# Patient Record
Sex: MALE | Race: WHITE | URBAN - METROPOLITAN AREA
[De-identification: names, ages, dates, MRNs, and addresses within clinical notes are randomized per-mention and may not be internally consistent; named-entity substitution may affect disease eponyms.]

---

## 2023-03-07 ENCOUNTER — OFFICE VISIT (OUTPATIENT)
Dept: PRIMARY CARE CLINIC | Age: 28
End: 2023-03-07
Payer: COMMERCIAL

## 2023-03-07 VITALS
HEART RATE: 103 BPM | BODY MASS INDEX: 45.1 KG/M2 | DIASTOLIC BLOOD PRESSURE: 75 MMHG | TEMPERATURE: 97.8 F | OXYGEN SATURATION: 95 % | HEIGHT: 70 IN | WEIGHT: 315 LBS | SYSTOLIC BLOOD PRESSURE: 123 MMHG

## 2023-03-07 DIAGNOSIS — Z86.59 HISTORY OF PANIC ATTACKS: ICD-10-CM

## 2023-03-07 DIAGNOSIS — E66.01 CLASS 3 SEVERE OBESITY DUE TO EXCESS CALORIES WITHOUT SERIOUS COMORBIDITY WITH BODY MASS INDEX (BMI) OF 45.0 TO 49.9 IN ADULT (HCC): ICD-10-CM

## 2023-03-07 DIAGNOSIS — E78.2 MIXED HYPERLIPIDEMIA: ICD-10-CM

## 2023-03-07 DIAGNOSIS — F41.8 MIXED ANXIETY AND DEPRESSIVE DISORDER: Primary | ICD-10-CM

## 2023-03-07 PROBLEM — F34.1 DYSTHYMIA: Status: ACTIVE | Noted: 2019-11-14

## 2023-03-07 PROBLEM — E66.813 CLASS 3 SEVERE OBESITY DUE TO EXCESS CALORIES WITHOUT SERIOUS COMORBIDITY WITH BODY MASS INDEX (BMI) OF 45.0 TO 49.9 IN ADULT: Status: ACTIVE | Noted: 2023-03-07

## 2023-03-07 PROCEDURE — 99203 OFFICE O/P NEW LOW 30 MIN: CPT | Performed by: FAMILY MEDICINE

## 2023-03-07 RX ORDER — CITALOPRAM 20 MG/1
20 TABLET ORAL DAILY
COMMUNITY
Start: 2022-10-17 | End: 2023-03-07 | Stop reason: SDUPTHER

## 2023-03-07 RX ORDER — BUSPIRONE HYDROCHLORIDE 30 MG/1
30 TABLET ORAL DAILY
Qty: 90 TABLET | Refills: 1 | Status: SHIPPED | OUTPATIENT
Start: 2023-03-07 | End: 2023-06-05

## 2023-03-07 RX ORDER — BUSPIRONE HYDROCHLORIDE 30 MG/1
30 TABLET ORAL DAILY
COMMUNITY
Start: 2022-10-17 | End: 2023-03-07 | Stop reason: SDUPTHER

## 2023-03-07 RX ORDER — CITALOPRAM 20 MG/1
20 TABLET ORAL DAILY
Qty: 30 TABLET | Refills: 1 | Status: SHIPPED | OUTPATIENT
Start: 2023-03-07 | End: 2023-06-05

## 2023-03-07 SDOH — ECONOMIC STABILITY: HOUSING INSECURITY
IN THE LAST 12 MONTHS, WAS THERE A TIME WHEN YOU DID NOT HAVE A STEADY PLACE TO SLEEP OR SLEPT IN A SHELTER (INCLUDING NOW)?: NO

## 2023-03-07 SDOH — ECONOMIC STABILITY: FOOD INSECURITY: WITHIN THE PAST 12 MONTHS, THE FOOD YOU BOUGHT JUST DIDN'T LAST AND YOU DIDN'T HAVE MONEY TO GET MORE.: NEVER TRUE

## 2023-03-07 SDOH — ECONOMIC STABILITY: FOOD INSECURITY: WITHIN THE PAST 12 MONTHS, YOU WORRIED THAT YOUR FOOD WOULD RUN OUT BEFORE YOU GOT MONEY TO BUY MORE.: NEVER TRUE

## 2023-03-07 SDOH — ECONOMIC STABILITY: INCOME INSECURITY: HOW HARD IS IT FOR YOU TO PAY FOR THE VERY BASICS LIKE FOOD, HOUSING, MEDICAL CARE, AND HEATING?: NOT HARD AT ALL

## 2023-03-07 ASSESSMENT — PATIENT HEALTH QUESTIONNAIRE - PHQ9
5. POOR APPETITE OR OVEREATING: 3
SUM OF ALL RESPONSES TO PHQ QUESTIONS 1-9: 12
9. THOUGHTS THAT YOU WOULD BE BETTER OFF DEAD, OR OF HURTING YOURSELF: 0
8. MOVING OR SPEAKING SO SLOWLY THAT OTHER PEOPLE COULD HAVE NOTICED. OR THE OPPOSITE, BEING SO FIGETY OR RESTLESS THAT YOU HAVE BEEN MOVING AROUND A LOT MORE THAN USUAL: 1
1. LITTLE INTEREST OR PLEASURE IN DOING THINGS: 3
4. FEELING TIRED OR HAVING LITTLE ENERGY: 1
6. FEELING BAD ABOUT YOURSELF - OR THAT YOU ARE A FAILURE OR HAVE LET YOURSELF OR YOUR FAMILY DOWN: 1
SUM OF ALL RESPONSES TO PHQ QUESTIONS 1-9: 12
7. TROUBLE CONCENTRATING ON THINGS, SUCH AS READING THE NEWSPAPER OR WATCHING TELEVISION: 0
2. FEELING DOWN, DEPRESSED OR HOPELESS: 3
SUM OF ALL RESPONSES TO PHQ9 QUESTIONS 1 & 2: 6
3. TROUBLE FALLING OR STAYING ASLEEP: 0
SUM OF ALL RESPONSES TO PHQ QUESTIONS 1-9: 12
SUM OF ALL RESPONSES TO PHQ QUESTIONS 1-9: 12

## 2023-03-07 ASSESSMENT — ENCOUNTER SYMPTOMS
SHORTNESS OF BREATH: 0
ABDOMINAL PAIN: 0
NAUSEA: 0
COUGH: 0
SORE THROAT: 0

## 2023-03-07 NOTE — PROGRESS NOTES
[address] PRIMARY CARE  [address] [place] [address]  Dept: [phone]  Dept Fax: [phone]     3/7/2023      Korina Moreno   1995     Chief Complaint   Patient presents with    Establish Care     Mental health med       HPI  Pt comes in today as a NP to establish care. Needing refills of meds. He has longstanding hx of depression/anxiety. Has been stable on Celexa and Buspar, but has now been out 1-2 weeks. Needing biometric form completed. Had labs done in 10/2022, that I could review today. Reports pre COVID actually got down to 190lbs. Reports briefly working with therapist years ago - that was not the right fit for him. Would be interested in that again. CHOLESTEROL, TOTAL <200 MG/ High   (NOTE)   DESIRABLE:   <200 MG/DL   BORDERLINE:  200-239 MG/DL   HIGHER RISK:  >239 MG/DL   TRIGLYCERIDE <150 MG/ High   (NOTE)   NORMAL: <150 MG/DL   BORDERLINE HIGH: 150-199 MG/DL   HIGH: 200-499 MG/DL   VERY HIGH: > OR = 500 MG/DL     \"Nonfasting specimens may have modest increases in Triglyceride   levels\" ([facility] Intern Med, 2019)   HDL CHOLESTEROL MG/DL 43  (NOTE)   DESIRABLE: > OR = 60 MG/DL   HIGHER RISK: <40 MG/DL   VLDL 4 - 28 MG/DL 62 High      LDL (CALCULATED) MG/         PHQ Scores 3/7/2023   PHQ2 Score 6   PHQ9 Score 12     Interpretation of Total Score Depression Severity: 1-4 = Minimal depression, 5-9 = Mild depression, 10-14 = Moderate depression, 15-19 = Moderately severe depression, 20-27 = Severe depression     Prior to Visit Medications    Medication Sig Taking?  Authorizing Provider   citalopram (CELEXA) 20 MG tablet Take 20 mg by mouth daily  Historical Provider, MD   busPIRone (BUSPAR) 30 MG tablet Take 30 mg by mouth daily  Historical Provider, MD       Past Medical History:   Diagnosis Date    History of panic attacks 3/7/2023    Mixed anxiety and depressive disorder 11/14/2019    Mixed hyperlipidemia 3/7/2023 Social History     Tobacco Use    Smoking status: Never    Smokeless tobacco: Never   Substance Use Topics    Alcohol use: Yes     Comment: rarely    Drug use: Never        Past Surgical History:   Procedure Laterality Date    CARPAL TUNNEL RELEASE Bilateral         Allergies   Allergen Reactions    Sulfa Antibiotics Hives        History reviewed. No pertinent family history. Patient's past medical history, surgical history, family history, medications, and allergies  were all reviewed and updated as appropriate today. Review of Systems   Constitutional:  Negative for fever. HENT:  Negative for ear pain and sore throat. Respiratory:  Negative for cough and shortness of breath. Cardiovascular:  Negative for chest pain. Gastrointestinal:  Negative for abdominal pain and nausea. Skin:  Negative for rash. Neurological:  Negative for dizziness and headaches. Hematological:  Negative for adenopathy. Psychiatric/Behavioral:  Positive for dysphoric mood. The patient is nervous/anxious. /75 (Cuff Size: Large Adult)   Pulse (!) 103   Temp 97.8 °F (36.6 °C) (Oral)   Ht 5' 10.25\" (1.784 m)   Wt (!) 322 lb 3.2 oz (146.1 kg)   SpO2 95%   BMI 45.90 kg/m²      Physical Exam  Vitals reviewed. Constitutional:       General: He is not in acute distress. Appearance: Normal appearance. He is well-developed. He is obese. HENT:      Head: Normocephalic and atraumatic. Right Ear: Tympanic membrane and ear canal normal. No drainage. No middle ear effusion. Tympanic membrane is not erythematous. Left Ear: Tympanic membrane and ear canal normal. No drainage. No middle ear effusion. Tympanic membrane is not erythematous. Nose: Nose normal. No rhinorrhea. Mouth/Throat:      Mouth: Mucous membranes are moist.      Pharynx: No oropharyngeal exudate or posterior oropharyngeal erythema. Eyes:      Extraocular Movements: Extraocular movements intact.       Pupils: Pupils are equal, round, and reactive to light. Neck:      Thyroid: No thyromegaly. Cardiovascular:      Rate and Rhythm: Regular rhythm. Tachycardia present. Heart sounds: No murmur heard. Pulmonary:      Effort: Pulmonary effort is normal.      Breath sounds: Normal breath sounds. No wheezing. Abdominal:      General: Bowel sounds are normal.      Palpations: Abdomen is soft. There is no mass. Tenderness: There is no abdominal tenderness. Musculoskeletal:         General: No swelling or deformity. Normal range of motion. Cervical back: Neck supple. Lymphadenopathy:      Cervical: No cervical adenopathy. Skin:     General: Skin is warm and dry. Findings: No rash. Neurological:      General: No focal deficit present. Mental Status: He is alert and oriented to person, place, and time. Cranial Nerves: No cranial nerve deficit. Psychiatric:         Mood and Affect: Mood normal.         Behavior: Behavior normal. Behavior is cooperative. Thought Content: Thought content normal.         Judgment: Judgment normal.       Assessment:  Encounter Diagnoses   Name Primary? Mixed anxiety and depressive disorder Yes    History of panic attacks     Mixed hyperlipidemia     Class 3 severe obesity due to excess calories without serious comorbidity with body mass index (BMI) of 45.0 to 49.9 in adult [facility])        Plan:  1. Mixed anxiety and depressive disorder  Chronic. Had been doing well on medications - needs refills today. He is interested in meeting with psychologist - will call back to schedule. F/U in 6 months. - citalopram (CELEXA) 20 MG tablet; Take 1 tablet by mouth daily  Dispense: 30 tablet; Refill: 1  - busPIRone (BUSPAR) 30 MG tablet; Take 30 mg by mouth daily  Dispense: 90 tablet; Refill: 1    2. History of panic attacks    3. Mixed hyperlipidemia  The cholesterol levels are high.  In order to lower overall risk in future of plaque buildup and heart disease/stroke, need to work on improving these levels. The key to avoiding cholesterol meds is diety and exercise. We can simply monitor with repeat levels in future. If not improved, then consider medication. 4. Class 3 severe obesity due to excess calories without serious comorbidity with body mass index (BMI) of 45.0 to 49.9 in adult [facility])  Patient was asked about current diet and exercise habits, and personalized advice was provided regarding recommended lifestyle changes. Patient's comorbid health conditions associated with elevated BMI were discussed, as well as the likely benefits weight loss. Based upon patient's motivation to change behavior, the following plan was agreed upon to work toward a weight loss goal - increasing CV activity, reducing carbs/calories and healthy eating habits. Educational materials for weight loss were provided. Patient will follow-up with myself at designated time in future. Return in about 6 months (around 9/7/2023) for Physical.               Clive Mejía, DO     Please note that this chart was generated using dragon dictation software. Although every effort was made to ensure the accuracy of this automated transcription, some errors in transcription may have occurred.

## 2023-05-07 DIAGNOSIS — F41.8 MIXED ANXIETY AND DEPRESSIVE DISORDER: ICD-10-CM

## 2023-05-08 RX ORDER — CITALOPRAM 20 MG/1
20 TABLET ORAL DAILY
Qty: 90 TABLET | Refills: 1 | Status: SHIPPED | OUTPATIENT
Start: 2023-05-08 | End: 2023-08-06

## 2023-07-07 ENCOUNTER — OFFICE VISIT (OUTPATIENT)
Dept: PRIMARY CARE CLINIC | Age: 28
End: 2023-07-07
Payer: COMMERCIAL

## 2023-07-07 VITALS
HEART RATE: 115 BPM | BODY MASS INDEX: 49.58 KG/M2 | SYSTOLIC BLOOD PRESSURE: 127 MMHG | OXYGEN SATURATION: 96 % | WEIGHT: 315 LBS | TEMPERATURE: 98.2 F | DIASTOLIC BLOOD PRESSURE: 78 MMHG

## 2023-07-07 DIAGNOSIS — E66.01 CLASS 3 SEVERE OBESITY DUE TO EXCESS CALORIES WITHOUT SERIOUS COMORBIDITY WITH BODY MASS INDEX (BMI) OF 45.0 TO 49.9 IN ADULT (HCC): ICD-10-CM

## 2023-07-07 DIAGNOSIS — R53.83 OTHER FATIGUE: ICD-10-CM

## 2023-07-07 DIAGNOSIS — E78.2 MIXED HYPERLIPIDEMIA: ICD-10-CM

## 2023-07-07 DIAGNOSIS — R06.83 SNORING: Primary | ICD-10-CM

## 2023-07-07 PROCEDURE — 99213 OFFICE O/P EST LOW 20 MIN: CPT | Performed by: FAMILY MEDICINE

## 2023-08-29 DIAGNOSIS — F41.8 MIXED ANXIETY AND DEPRESSIVE DISORDER: ICD-10-CM

## 2023-08-29 RX ORDER — BUSPIRONE HYDROCHLORIDE 30 MG/1
TABLET ORAL
Qty: 90 TABLET | Refills: 1 | Status: SHIPPED | OUTPATIENT
Start: 2023-08-29

## 2023-08-29 NOTE — TELEPHONE ENCOUNTER
Medication:   Requested Prescriptions     Pending Prescriptions Disp Refills    busPIRone (BUSPAR) 30 MG tablet [Pharmacy Med Name: BUSPIRONE 30MG TABLETS] 90 tablet 1     Sig: TAKE 1 TABLET BY MOUTH DAILY     Last Filled:  6/3/23    Last appt: 7/7/2023   Next appt: Visit date not foundMedication:

## 2023-08-31 PROBLEM — E66.01 CLASS 3 SEVERE OBESITY DUE TO EXCESS CALORIES WITHOUT SERIOUS COMORBIDITY WITH BODY MASS INDEX (BMI) OF 45.0 TO 49.9 IN ADULT (HCC): Chronic | Status: ACTIVE | Noted: 2023-03-07

## 2023-08-31 PROBLEM — F41.8 MIXED ANXIETY AND DEPRESSIVE DISORDER: Chronic | Status: ACTIVE | Noted: 2019-11-14

## 2023-08-31 PROBLEM — E78.2 MIXED HYPERLIPIDEMIA: Chronic | Status: ACTIVE | Noted: 2023-03-07

## 2023-08-31 PROBLEM — E66.813 CLASS 3 SEVERE OBESITY DUE TO EXCESS CALORIES WITHOUT SERIOUS COMORBIDITY WITH BODY MASS INDEX (BMI) OF 45.0 TO 49.9 IN ADULT: Chronic | Status: ACTIVE | Noted: 2023-03-07

## 2023-09-13 ENCOUNTER — HOSPITAL ENCOUNTER (OUTPATIENT)
Dept: SLEEP CENTER | Age: 28
Discharge: HOME | End: 2023-09-13
Payer: COMMERCIAL

## 2023-09-13 DIAGNOSIS — G47.10 HYPERSOMNIA: ICD-10-CM

## 2023-09-13 DIAGNOSIS — R06.83 SNORING: ICD-10-CM

## 2023-09-13 PROCEDURE — 95806 SLEEP STUDY UNATT&RESP EFFT: CPT

## 2023-09-25 ENCOUNTER — TELEPHONE (OUTPATIENT)
Dept: PULMONOLOGY | Age: 28
End: 2023-09-25

## 2023-11-09 DIAGNOSIS — F41.8 MIXED ANXIETY AND DEPRESSIVE DISORDER: ICD-10-CM

## 2023-11-09 RX ORDER — CITALOPRAM 20 MG/1
20 TABLET ORAL DAILY
Qty: 90 TABLET | Refills: 1 | Status: SHIPPED | OUTPATIENT
Start: 2023-11-09 | End: 2024-02-07

## 2023-11-16 ENCOUNTER — OFFICE VISIT (OUTPATIENT)
Dept: PRIMARY CARE CLINIC | Age: 28
End: 2023-11-16

## 2023-11-16 VITALS
TEMPERATURE: 98.3 F | HEIGHT: 70 IN | BODY MASS INDEX: 45.1 KG/M2 | OXYGEN SATURATION: 96 % | DIASTOLIC BLOOD PRESSURE: 76 MMHG | SYSTOLIC BLOOD PRESSURE: 130 MMHG | HEART RATE: 99 BPM | WEIGHT: 315 LBS

## 2023-11-16 DIAGNOSIS — Z00.00 ANNUAL PHYSICAL EXAM: Primary | ICD-10-CM

## 2023-11-16 DIAGNOSIS — F41.8 MIXED ANXIETY AND DEPRESSIVE DISORDER: Chronic | ICD-10-CM

## 2023-11-16 DIAGNOSIS — Z23 FLU VACCINE NEED: ICD-10-CM

## 2023-11-16 DIAGNOSIS — E78.2 MIXED HYPERLIPIDEMIA: Chronic | ICD-10-CM

## 2023-11-16 DIAGNOSIS — E66.01 CLASS 3 SEVERE OBESITY DUE TO EXCESS CALORIES WITHOUT SERIOUS COMORBIDITY WITH BODY MASS INDEX (BMI) OF 50.0 TO 59.9 IN ADULT (HCC): ICD-10-CM

## 2023-11-16 DIAGNOSIS — G47.30 SLEEP APNEA WITH USE OF CONTINUOUS POSITIVE AIRWAY PRESSURE (CPAP): ICD-10-CM

## 2023-11-16 ASSESSMENT — ENCOUNTER SYMPTOMS
SORE THROAT: 0
COUGH: 0
SHORTNESS OF BREATH: 0
ABDOMINAL PAIN: 0
NAUSEA: 0

## 2023-11-16 NOTE — PROGRESS NOTES
[address] [facility]. PRIMARY CARE  [address] [facility] [address]  Dept: [phone]  Dept Fax: [phone]     11/16/2023      Nadir Treadwell   1995     Chief Complaint   Patient presents with    Annual Exam       HPI  Pt comes in today for physical. Formal diagnosis with SUSANNE using CPAP now for 1 month. Happy with results. Does want to lose weight, but having trouble getting motivated and on track with this. No new concerns. 3/7/2023     8:10 AM   PHQ Scores   PHQ2 Score 6   PHQ9 Score 12     Interpretation of Total Score Depression Severity: 1-4 = Minimal depression, 5-9 = Mild depression, 10-14 = Moderate depression, 15-19 = Moderately severe depression, 20-27 = Severe depression     Prior to Visit Medications    Medication Sig Taking? Authorizing Provider   citalopram (CELEXA) 20 MG tablet TAKE 1 TABLET BY MOUTH DAILY Yes Evert Carver DO   busPIRone (BUSPAR) 30 MG tablet TAKE 1 TABLET BY MOUTH DAILY Yes Evert Griffith DO       Past Medical History:   Diagnosis Date    History of panic attacks 03/07/2023    Mixed anxiety and depressive disorder 11/14/2019    Mixed hyperlipidemia 03/07/2023    Sleep apnea with use of continuous positive airway pressure (CPAP) 11/16/2023        Social History     Tobacco Use    Smoking status: Never     Passive exposure: Never    Smokeless tobacco: Never   Substance Use Topics    Alcohol use: Yes     Comment: rarely    Drug use: Never        Past Surgical History:   Procedure Laterality Date    CARPAL TUNNEL RELEASE Bilateral         Allergies   Allergen Reactions    Sulfa Antibiotics Hives        History reviewed. No pertinent family history. Patient's past medical history, surgical history, family history, medications, and allergies  were all reviewed and updated as appropriate today. Review of Systems   Constitutional:  Positive for unexpected weight change (gain). Negative for fever.    HENT:

## 2023-11-16 NOTE — PATIENT INSTRUCTIONS
[address] [facility] Locations - No appointment necessary. ? indicates the location is open Saturdays in addition to Monday through Friday. Call your preferred location for test preparation, business hours and other information you need. [employer] accepts [employer]. [facility]    ? [place] [id number] [facility]. [facility], [address]    Ph: [address] [facility], [address]    Ph: [phone]   ? [address]    [place], [address] [facility]    Ph: [address] [place], [address] [address]    Ph: [phone] ? [place]   [address]   Ph: [phone]  ? [address] [facility], [address] [facility]    Ph: [place] [address] [facility], [address] [facility]   Ph: [phone]    NORTH    ? [place], [place] [address]    Ph: [phone]  [facility], [facility]   Ph: [place], [phone] [facility]: [phone], [facility] [facility]    Ph: [address] [facility].  [address] [place] [address]    Ph: [phone]

## 2023-12-05 PROBLEM — G47.33 OBSTRUCTIVE SLEEP APNEA (ADULT) (PEDIATRIC): Status: ACTIVE | Noted: 2023-11-16

## 2024-01-17 ENCOUNTER — TELEMEDICINE (OUTPATIENT)
Age: 29
End: 2024-01-17
Payer: COMMERCIAL

## 2024-01-17 DIAGNOSIS — F41.1 GENERALIZED ANXIETY DISORDER: ICD-10-CM

## 2024-01-17 PROBLEM — F32.A DEPRESSION: Status: ACTIVE | Noted: 2019-11-14

## 2024-01-17 PROCEDURE — 90791 PSYCH DIAGNOSTIC EVALUATION: CPT | Performed by: PSYCHOLOGIST

## 2024-01-17 NOTE — PROGRESS NOTES
Behavioral Health Consultation  Lorena Johnson, Ph.D.  Clinical Psychologist  1/17/2024  9:00 AM  Name: Renny Garrison  YOB: 1995  PCP: Chris Carver DO     TELEHEALTH VISIT -- Audio/Visual (During COVID-19 public health emergency)  Time spent with Renny: 30 minutes  This is his first [facility] appointment.  Reason for Consult:  Anxiety and Depression     He provided informed consent for the behavioral health services. Discussed with him model of service to include the limits of confidentiality (i.e. abuse reporting, suicide intervention, etc.) and short-term intervention focused approach. He indicated understanding.     S:  Renny is a 28 y.o. male seen per Chris Carver DO addressing anxiety, depression.  He describes symptoms consistent with diagnosis of ANN, Depression. Onset of symptoms in HS; reports recent concerns about weight gain that began before COVID but increased significant during covid with ordering food/more sugar, awareness of his weight worsens mood.  Reports recent diagnosis of sleep apnea; using CPAP.      Current symptoms include excessive worry thoughts/apprehension which is difficult to control, muscle tension, GI distress during times of worry/stress, avoidance of anxiety-provoking stimuli/situations, mild depressed mood, diminished concentration/indecisiveness, increased appetite, weight gain, and excessive guilt.  He has a history of panic attacks, last full panic attack 6 mo ago, more recently panic-like symptoms.  He notices inadequate sleep triggers panic attacks.  Denies SI/HI plans or intent.  No hx of suicide attempts.      Renny is taking Celexa and Buspar, reports benefit from Celexa - he is unsure if Buspar is helpful.  Past treatment history includes: Reports briefly working with therapist years ago - that was not the right fit for him, not enough feedback/direction.  No history of psychiatric hospitalization.     Alcohol use:  rarely, 1-2

## 2024-01-31 ENCOUNTER — TELEMEDICINE (OUTPATIENT)
Age: 29
End: 2024-01-31
Payer: COMMERCIAL

## 2024-01-31 DIAGNOSIS — F41.1 GENERALIZED ANXIETY DISORDER: Primary | ICD-10-CM

## 2024-01-31 PROCEDURE — 90832 PSYTX W PT 30 MINUTES: CPT | Performed by: PSYCHOLOGIST

## 2024-01-31 NOTE — PROGRESS NOTES
Behavioral Health Consultation  Lorena Johnson, Ph.D.  Clinical Psychologist  1/31/2024  8:30 AM  Name: Renny Garrison  YOB: 1995  PCP: Chris Carver, DO     TELEHEALTH VISIT -- Audio/Visual (During COVID-19 public health emergency)  Time spent with Renny: 30 minutes  This is his second [facility] appointment.  Reason for Consult: Anxiety     S:  Renny is a 28 y.o. male seen for [facility] follow up visit addressing ANN. Describes recent mood as anxious, a little more optimistic feeling after starting  services.  He wishes to focus on weight mgmt concerns today.  Discussed previous efforts and successes with weight mgmt. Reports Covid derailed his most recent efforts that were working.  Pre-covid he was using "[employer] xochilt, was in a routine of tracking his eating (says "[employer] categorizes food in 3 areas by caloric density), reading articles and found the accountability of the xochilt was helpful.  He finds accountability and feedback helps with his goals.  He was also exercising (running) and did a 10K and wanted to run a half marathon.  States physically running is something he could pursue again; also has treadmill downstairs.   Barriers in the past: he becomes \"obsessed\" with change, \"all in,\" and finds when a barrier arises it can derail his system.  Has not been cooking for himself much recently, orders food.    O:  MSE:  Appearance: good hygiene   Attitude: cooperative and friendly  Consciousness: alert  Orientation: oriented to person, place, time, general circumstance  Memory: recent and remote memory intact  Attention/Concentration: intact during session  Psychomotor Activity: normal  Eye Contact: normal  Speech: normal rate and volume, well-articulated  Mood: anxious  Affect: anxious  Perception: within normal limits  Thought Content: within normal limits  Thought Process: logical, coherent and goal-directed  Insight: good  Judgment: intact  Ability to understand instructions: Yes  Ability to

## 2024-02-10 DIAGNOSIS — E78.2 MIXED HYPERLIPIDEMIA: Chronic | ICD-10-CM

## 2024-02-10 DIAGNOSIS — Z00.00 ANNUAL PHYSICAL EXAM: ICD-10-CM

## 2024-02-10 LAB
ALBUMIN SERPL-MCNC: 5 G/DL (ref 3.4–5)
ALBUMIN/GLOB SERPL: 2.5 {RATIO} (ref 1.1–2.2)
ALP SERPL-CCNC: 96 U/L (ref 40–129)
ALT SERPL-CCNC: 42 U/L (ref 10–40)
ANION GAP SERPL CALCULATED.3IONS-SCNC: 12 MMOL/L (ref 3–16)
AST SERPL-CCNC: 26 U/L (ref 15–37)
BASOPHILS # BLD: 0 K/UL (ref 0–0.2)
BASOPHILS NFR BLD: 0.3 %
BILIRUB SERPL-MCNC: 0.4 MG/DL (ref 0–1)
BUN SERPL-MCNC: 12 MG/DL (ref 7–20)
CALCIUM SERPL-MCNC: 9.8 MG/DL (ref 8.3–10.6)
CHLORIDE SERPL-SCNC: 100 MMOL/L (ref 99–110)
CHOLEST SERPL-MCNC: 246 MG/DL (ref 0–199)
CO2 SERPL-SCNC: 26 MMOL/L (ref 21–32)
CREAT SERPL-MCNC: 0.8 MG/DL (ref 0.9–1.3)
DEPRECATED RDW RBC AUTO: 13.8 % (ref 12.4–15.4)
EOSINOPHIL # BLD: 0.1 K/UL (ref 0–0.6)
EOSINOPHIL NFR BLD: 2.4 %
GFR SERPLBLD CREATININE-BSD FMLA CKD-EPI: >60 ML/MIN/{1.73_M2}
GLUCOSE P FAST SERPL-MCNC: 89 MG/DL (ref 70–99)
HCT VFR BLD AUTO: 45.2 % (ref 40.5–52.5)
HDLC SERPL-MCNC: 33 MG/DL (ref 40–60)
HGB BLD-MCNC: 15.3 G/DL (ref 13.5–17.5)
LDL CHOLESTEROL CALCULATED: 173 MG/DL
LYMPHOCYTES # BLD: 2 K/UL (ref 1–5.1)
LYMPHOCYTES NFR BLD: 32.9 %
MCH RBC QN AUTO: 29.9 PG (ref 26–34)
MCHC RBC AUTO-ENTMCNC: 33.9 G/DL (ref 31–36)
MCV RBC AUTO: 88.3 FL (ref 80–100)
MONOCYTES # BLD: 0.5 K/UL (ref 0–1.3)
MONOCYTES NFR BLD: 8.3 %
NEUTROPHILS # BLD: 3.3 K/UL (ref 1.7–7.7)
NEUTROPHILS NFR BLD: 56.1 %
PLATELET # BLD AUTO: 287 K/UL (ref 135–450)
PMV BLD AUTO: 9.2 FL (ref 5–10.5)
POTASSIUM SERPL-SCNC: 4.8 MMOL/L (ref 3.5–5.1)
PROT SERPL-MCNC: 7 G/DL (ref 6.4–8.2)
RBC # BLD AUTO: 5.11 M/UL (ref 4.2–5.9)
SODIUM SERPL-SCNC: 138 MMOL/L (ref 136–145)
TRIGL SERPL-MCNC: 200 MG/DL (ref 0–150)
TSH SERPL DL<=0.005 MIU/L-ACNC: 2.88 UIU/ML (ref 0.27–4.2)
VLDLC SERPL CALC-MCNC: 40 MG/DL
WBC # BLD AUTO: 6 K/UL (ref 4–11)

## 2024-02-13 NOTE — RESULT ENCOUNTER NOTE
Please let pt know I have reviewed labs. Cholesterol levels are very abnormal still, similar to previously checked levels. Although now issues from this now, this can be significant risk factor for heart disease and stroke in future. Needs to work on diet/exercise and significant weight loss to improve these things. Next visit together we will discuss further.

## 2024-02-14 ENCOUNTER — TELEMEDICINE (OUTPATIENT)
Age: 29
End: 2024-02-14
Payer: COMMERCIAL

## 2024-02-14 DIAGNOSIS — F41.1 GENERALIZED ANXIETY DISORDER: Primary | ICD-10-CM

## 2024-02-14 PROCEDURE — 90832 PSYTX W PT 30 MINUTES: CPT | Performed by: PSYCHOLOGIST

## 2024-02-14 NOTE — PROGRESS NOTES
Behavioral Health Consultation  Lorena Johnson, Ph.D.  Clinical Psychologist  2/14/2024  8:30 AM  Name: Renny Garrison  YOB: 1995  PCP: Chris Carver, DO     TELEHEALTH VISIT -- Audio/Visual (During COVID-19 public health emergency)  Time spent with Renny: 30 minutes  This is his third [facility] appointment.  Reason for Consult: Anxiety     S:  Renny is a 28 y.o. male seen for [facility] follow up visit addressing ANN.  Patient is feeling anxious today - feels overall he's managing anxiety relatively well recently with distraction/redirection and feeling more hopeful that he is taking action toward changes he has wanted for a while.  Regarding goals discussed last visit, he did a small amount of tracking (disliked xochilt interface), has not started walking for exercise; however did make changes in how often he is eating out/fast foods.  He was often eating out or ordering food delivery (fast foods, chicken, pizza) for lunch and dinner meals; has made more effort toward eating a sandwich and salad for lunch some days in office and reading during lunch.  Discussed perceived barriers to goals and identified more workable next steps toward his primary goals of improved eating.  He is going to set rule that he has to go  food if ordering food vs. delivery to decrease frequency/ease of eating out.  He also identifies pizza as something he is likely to overeat; plans to stop ordering pizza.  He drinks teas, bottled shivani iced tea, and has been working on drinking more tea with less sweetener.     He is taking celexa.  He self discontinued buspar, he's felt unsure if buspar was beneficial and feels anxiety is more manageable and that buspar is less needed.     O:  MSE:  Appearance: good hygiene   Attitude: cooperative and friendly  Consciousness: alert  Orientation: oriented to person, place, time, general circumstance  Memory: recent and remote memory intact  Attention/Concentration: intact during

## 2024-03-06 NOTE — PROGRESS NOTES
Diagnosis: [x] SUSANNE (G47.33) [] CSA (G47.31) [] Apnea (G47.30)   Length of Need: [x] 15 Months [] 99 Months [] Other:   Machine (MARCI!): [] Respironics Dream Station      Auto [] ResMed AirSense     Auto [] Other:     []  CPAP () [] Bilevel ()   Mode: [] Auto [] Spontaneous    Mode: [] Auto [] Spontaneous             Comfort Settings:      Humidifier: [] Heated ()        [x] Water chamber replacement ()/ 1 per 6 months        Mask:   [] Nasal () /1 per 3 months [x] Full Face () /1 per 3 months   [] Patient choice -Size and fit mask [x] Patient Choice - Size and fit mask   [] Dispense: [] Dispense:   [] Headgear () / 1 per 3 months [x] Headgear () / 1 per 3 months   [] Replacement Nasal Cushion ()/2 per month [x] Interface Replacement ()/1 per month   [] Replacement Nasal Pillows ()/2 per month         Tubing: [x] Heated ()/1 per 3 months    [] Standard ()/1 per 3 months [] Other:           Filters: [x] Non-disposable ()/1 per 6 months     [x] Ultra-Fine, Disposable ()/2 per month        Miscellaneous: [] Chin Strap ()/ 1 per 6 months [] O2 bleed-in:        LPM   [] Oxymetry on CPAP/Bilevel []  Other:         Start Order Date: 03/06/24    MEDICAL JUSTIFICATION:  I, the undersigned, certify that the above prescribed supplies are medically necessary for this patient’s wellbeing.  In my opinion, the supplies are both reasonable and necessary in reference to accepted standards of medicalpractice in treatment of this patient’s condition.    LADARIUS HICKEY NP    NPI: [id number]       Order Signed Date: 03/06/24  [facility]  Pulmonary, Sleep, and Critical Care    Pulmonary, Sleep, and Critical Care  [facility] [address]                          [address] [place] [address] [address]  [place] [address]                                    [place] [address]  Phone: [phone]    Fax:

## 2024-03-20 ENCOUNTER — TELEMEDICINE (OUTPATIENT)
Age: 29
End: 2024-03-20
Payer: COMMERCIAL

## 2024-03-20 DIAGNOSIS — F41.1 GENERALIZED ANXIETY DISORDER: Primary | ICD-10-CM

## 2024-03-20 PROCEDURE — 90832 PSYTX W PT 30 MINUTES: CPT | Performed by: PSYCHOLOGIST

## 2024-03-20 NOTE — PROGRESS NOTES
Behavioral Health Consultation  Lorena Johnson, Ph.D.  Clinical Psychologist  3/20/2024  8:30 AM  Name: Renny Garrison  YOB: 1995  PCP: Chris Carver, DO     TELEHEALTH VISIT -- Audio/Visual (During COVID-19 public health emergency)  Time spent with Renny: 30 minutes  This is his third [facility] appointment.  Reason for Consult: Anxiety     S:  Renny is a 28 y.o. male seen for [facility] follow up visit addressing ANN, healthier eating.  Patient reports some improvement overall particularly in healthy eating.  He has been preparing food at home, bringing his lunch to eat at work (sandwich, salad), eating healthier options at home (e.g., eggs).  Describes some improvement in how he was feeling and building confidence about this change until recent work related stressor - had a project that was made a priority with short and unmanageable timeline.  He has been working overtime which he typically tries to avoid and finding himself ordering food delivery and less healthy meals/portions during this stressor - feeling really hard on himself about this.  Provided support, validation, normalized challenges maintaining new improvements during times of stress and praised overall improvement and effort.  Discussed timeline of current project, ways to moderate changes in eating choices while ordering food during remaining days, and gradually resuming new improvements over the weekend.  He has plans for self-care this weekend; reading, playing a game, music and did prioritize some enjoyable activities even when working extra hours in the last week.      O:  MSE:  Appearance: good hygiene   Attitude: cooperative and friendly  Consciousness: alert  Orientation: oriented to person, place, time, general circumstance  Memory: recent and remote memory intact  Attention/Concentration: intact during session  Psychomotor Activity: normal  Eye Contact: normal  Speech: normal rate and volume, well-articulated  Mood:

## 2024-03-27 ENCOUNTER — TELEMEDICINE (OUTPATIENT)
Age: 29
End: 2024-03-27
Payer: COMMERCIAL

## 2024-03-27 DIAGNOSIS — F41.1 GENERALIZED ANXIETY DISORDER: Primary | ICD-10-CM

## 2024-03-27 PROCEDURE — 90832 PSYTX W PT 30 MINUTES: CPT | Performed by: PSYCHOLOGIST

## 2024-03-27 NOTE — PROGRESS NOTES
Behavioral Health Consultation  Lorena Johnson, Ph.D.  Clinical Psychologist  3/27/2024  8:30 AM  Name: Renny Garrison  YOB: 1995  PCP: Chris Carver, DO     TELEHEALTH VISIT -- Audio/Visual (During COVID-19 public health emergency)  Time spent with Renny: 30 minutes  This is his fourth [facility] appointment.  Reason for Consult: Anxiety     S:  Renny is a 28 y.o. male seen for [facility] follow up visit addressing ANN, healthier eating.   Reduction in anxious mood between visit.  States work stress has improved, \"back to normal work stress, not heightened work stress,\" still on previous project but has more time to complete it.  He is looking forward to having a long week - plans to go to the grocery to prepare for healthier eating starting next week, healthy staples for lunches, planning one cooked meal as well.  Does not see likely barriers/stressors in the next couple weeks.      He has plans to spend time with friends (DnD, board games), has 3D printers now and is enjoying making figurines for their "[employer] game.  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  He's been doing some clean up and updates at home - wants to do some stretching (back has been hurting).      O:  MSE:  Appearance: good hygiene   Attitude: cooperative and friendly  Consciousness: alert  Orientation: oriented to person, place, time, general circumstance  Memory: recent and remote memory intact  Attention/Concentration: intact during session  Psychomotor Activity: normal  Eye Contact: normal  Speech: normal rate and volume, well-articulated  Mood: anxious, improving  Affect: anxious  Perception: within normal limits  Thought Content: within normal limits  Thought Process: logical, coherent and goal-directed  Insight: good  Judgment: intact  Ability to understand instructions: Yes  Ability to respond meaningfully:

## 2024-04-15 ENCOUNTER — TELEMEDICINE (OUTPATIENT)
Age: 29
End: 2024-04-15
Payer: COMMERCIAL

## 2024-04-15 DIAGNOSIS — F41.1 GENERALIZED ANXIETY DISORDER: Primary | ICD-10-CM

## 2024-04-15 PROCEDURE — 90832 PSYTX W PT 30 MINUTES: CPT | Performed by: PSYCHOLOGIST

## 2024-04-15 NOTE — PROGRESS NOTES
Severe depression    Diagnosis:  1. Generalized anxiety disorder      Plan:  Interventions  Collaboratively discussed recent stressors, provided support and validation.  Reviewed progress and provided praise for effective coping.  Praised plans to maintain and increase self-care/stress mgmt activities during times of increased stress.  Provided support, validated current challenges, explored barriers to goals.  Engaged in manageable goal-setting in primary areas of concern (below).      Behavioral Change Plan  See above.  Patient identified the following goals for this week:    Cook chicken breasts on Wednesday (he works from home that day).  He wants to use that for 3 dinners in the upcoming week.  Reports this was a \"staple\" in previous eating and enjoys it.  He has frozen veggies he could pair with it.    Continue going for walks when weather permits - mentioned walking on the bike path near his house at lunchtime or after work or walking playing [employer].  Follow up with Dr. Johnson in 2-3 weeks, sooner if needed.    Return in 16 days (on 5/1/2024).        Verified the following patient information:  Identification: Yes  Location: [address] [place] [address]   Call back number: [phone]   Emergency contact's name and number, as well as permission to contact them if needed: Extended Emergency Contact Information  Primary Emergency Contact: YUMIKOBRUNILDA  Home Phone: [phone]  Relation: Parent  Secondary Emergency Contact: YUMIKONELSY  Home Phone: [phone]  Relation: Parent   Provider location:  [facility]    Renny Garrison, was evaluated through a synchronous (real-time) audio-video encounter. The patient (or guardian if applicable) is aware that this is a billable service, which includes applicable co-pays. This Virtual Visit was conducted with patient's (and/or legal guardian's) consent.  Patient identification was verified, and a caregiver was present when appropriate.

## 2024-05-01 ENCOUNTER — TELEMEDICINE (OUTPATIENT)
Age: 29
End: 2024-05-01
Payer: COMMERCIAL

## 2024-05-01 DIAGNOSIS — F41.1 GENERALIZED ANXIETY DISORDER: Primary | ICD-10-CM

## 2024-05-01 PROCEDURE — 90832 PSYTX W PT 30 MINUTES: CPT | Performed by: PSYCHOLOGIST

## 2024-05-01 NOTE — PROGRESS NOTES
Behavioral Health Consultation  Lorena Johnson, Ph.D.  Clinical Psychologist  5/1/2024  8:30 AM  Name: Renny Garrison  YOB: 1995  PCP: Chris Carver, DO     TELEHEALTH VISIT -- Audio/Visual (During COVID-19 public health emergency)  Time spent with Renny: 30 minutes  This is his sixth [facility] appointment.  Reason for Consult: Anxiety     S:  Renny is a 28 y.o. male seen for [facility] follow up visit addressing ANN, healthier eating.    He is enjoying warmer weather, positive impact on mood, feels like he is getting momentum - cleaned out sun room/storage room and using this for morning stretching and going on walks 2x/day.  He set up smart home lights up to come on right after alarm which is helping him get more done in the morning.  He's trying to go to bed closer to 11pm vs. midnight.  Has other reminders that go off to choose something from his list; likes the flexibility and allows him to be \"more present in my life.\"  He's marinading chicken this morning for meal prep.  Discussed \"establishing a system\" and maintaining changes.      O:  MSE:  Appearance: good hygiene   Attitude: cooperative and friendly  Consciousness: alert  Orientation: oriented to person, place, time, general circumstance  Memory: recent and remote memory intact  Attention/Concentration: intact during session  Psychomotor Activity: normal  Eye Contact: normal  Speech: normal rate and volume, well-articulated  Mood: anxious, improving  Affect: anxious, improving   Perception: within normal limits  Thought Content: within normal limits  Thought Process: logical, coherent and goal-directed  Insight: good  Judgment: intact  Ability to understand instructions: Yes  Ability to respond meaningfully: Yes  Morbid Ideation: no   Suicide Assessment: no suicidal ideation, plan, or intent  Homicidal Ideation: no    History:  Social History:   Social History     Socioeconomic History    Marital status: Single     Spouse name: Not

## 2024-05-08 DIAGNOSIS — F41.8 MIXED ANXIETY AND DEPRESSIVE DISORDER: ICD-10-CM

## 2024-05-08 RX ORDER — CITALOPRAM 20 MG/1
20 TABLET ORAL DAILY
Qty: 90 TABLET | Refills: 1 | Status: SHIPPED | OUTPATIENT
Start: 2024-05-08 | End: 2024-08-06

## 2024-05-08 RX ORDER — BUSPIRONE HYDROCHLORIDE 30 MG/1
TABLET ORAL
Qty: 90 TABLET | Refills: 1 | Status: SHIPPED | OUTPATIENT
Start: 2024-05-08 | End: 2024-05-14

## 2024-05-08 NOTE — TELEPHONE ENCOUNTER
Medication:   Requested Prescriptions     Pending Prescriptions Disp Refills    busPIRone (BUSPAR) 30 MG tablet [Pharmacy Med Name: BUSPIRONE 30MG TABLETS] 90 tablet 1     Sig: TAKE 1 TABLET BY MOUTH DAILY    citalopram (CELEXA) 20 MG tablet [Pharmacy Med Name: CITALOPRAM 20MG TABLETS] 90 tablet 1     Sig: TAKE 1 TABLET BY MOUTH DAILY     Last Filled:  2/8/24    Last appt: 11/16/2023   Next appt: Visit date not found

## 2024-05-11 SDOH — ECONOMIC STABILITY: FOOD INSECURITY: WITHIN THE PAST 12 MONTHS, YOU WORRIED THAT YOUR FOOD WOULD RUN OUT BEFORE YOU GOT MONEY TO BUY MORE.: NEVER TRUE

## 2024-05-11 SDOH — ECONOMIC STABILITY: INCOME INSECURITY: HOW HARD IS IT FOR YOU TO PAY FOR THE VERY BASICS LIKE FOOD, HOUSING, MEDICAL CARE, AND HEATING?: NOT HARD AT ALL

## 2024-05-11 SDOH — ECONOMIC STABILITY: TRANSPORTATION INSECURITY
IN THE PAST 12 MONTHS, HAS LACK OF TRANSPORTATION KEPT YOU FROM MEETINGS, WORK, OR FROM GETTING THINGS NEEDED FOR DAILY LIVING?: NO

## 2024-05-11 SDOH — ECONOMIC STABILITY: FOOD INSECURITY: WITHIN THE PAST 12 MONTHS, THE FOOD YOU BOUGHT JUST DIDN'T LAST AND YOU DIDN'T HAVE MONEY TO GET MORE.: NEVER TRUE

## 2024-05-11 ASSESSMENT — PATIENT HEALTH QUESTIONNAIRE - PHQ9
4. FEELING TIRED OR HAVING LITTLE ENERGY: NOT AT ALL
3. TROUBLE FALLING OR STAYING ASLEEP: NOT AT ALL
10. IF YOU CHECKED OFF ANY PROBLEMS, HOW DIFFICULT HAVE THESE PROBLEMS MADE IT FOR YOU TO DO YOUR WORK, TAKE CARE OF THINGS AT HOME, OR GET ALONG WITH OTHER PEOPLE: SOMEWHAT DIFFICULT
9. THOUGHTS THAT YOU WOULD BE BETTER OFF DEAD, OR OF HURTING YOURSELF: NOT AT ALL
7. TROUBLE CONCENTRATING ON THINGS, SUCH AS READING THE NEWSPAPER OR WATCHING TELEVISION: NOT AT ALL
SUM OF ALL RESPONSES TO PHQ QUESTIONS 1-9: 2
7. TROUBLE CONCENTRATING ON THINGS, SUCH AS READING THE NEWSPAPER OR WATCHING TELEVISION: NOT AT ALL
SUM OF ALL RESPONSES TO PHQ QUESTIONS 1-9: 2
1. LITTLE INTEREST OR PLEASURE IN DOING THINGS: NOT AT ALL
2. FEELING DOWN, DEPRESSED OR HOPELESS: SEVERAL DAYS
SUM OF ALL RESPONSES TO PHQ QUESTIONS 1-9: 2
5. POOR APPETITE OR OVEREATING: SEVERAL DAYS
SUM OF ALL RESPONSES TO PHQ QUESTIONS 1-9: 2
6. FEELING BAD ABOUT YOURSELF - OR THAT YOU ARE A FAILURE OR HAVE LET YOURSELF OR YOUR FAMILY DOWN: NOT AT ALL
2. FEELING DOWN, DEPRESSED OR HOPELESS: SEVERAL DAYS
6. FEELING BAD ABOUT YOURSELF - OR THAT YOU ARE A FAILURE OR HAVE LET YOURSELF OR YOUR FAMILY DOWN: NOT AT ALL
8. MOVING OR SPEAKING SO SLOWLY THAT OTHER PEOPLE COULD HAVE NOTICED. OR THE OPPOSITE, BEING SO FIGETY OR RESTLESS THAT YOU HAVE BEEN MOVING AROUND A LOT MORE THAN USUAL: NOT AT ALL
SUM OF ALL RESPONSES TO PHQ9 QUESTIONS 1 & 2: 1
8. MOVING OR SPEAKING SO SLOWLY THAT OTHER PEOPLE COULD HAVE NOTICED. OR THE OPPOSITE - BEING SO FIDGETY OR RESTLESS THAT YOU HAVE BEEN MOVING AROUND A LOT MORE THAN USUAL: NOT AT ALL
9. THOUGHTS THAT YOU WOULD BE BETTER OFF DEAD, OR OF HURTING YOURSELF: NOT AT ALL
4. FEELING TIRED OR HAVING LITTLE ENERGY: NOT AT ALL
10. IF YOU CHECKED OFF ANY PROBLEMS, HOW DIFFICULT HAVE THESE PROBLEMS MADE IT FOR YOU TO DO YOUR WORK, TAKE CARE OF THINGS AT HOME, OR GET ALONG WITH OTHER PEOPLE: SOMEWHAT DIFFICULT
1. LITTLE INTEREST OR PLEASURE IN DOING THINGS: NOT AT ALL
5. POOR APPETITE OR OVEREATING: SEVERAL DAYS
3. TROUBLE FALLING OR STAYING ASLEEP: NOT AT ALL
SUM OF ALL RESPONSES TO PHQ QUESTIONS 1-9: 2

## 2024-05-14 ENCOUNTER — OFFICE VISIT (OUTPATIENT)
Dept: PRIMARY CARE CLINIC | Age: 29
End: 2024-05-14
Payer: COMMERCIAL

## 2024-05-14 VITALS
BODY MASS INDEX: 49.85 KG/M2 | TEMPERATURE: 98.2 F | OXYGEN SATURATION: 97 % | HEART RATE: 94 BPM | SYSTOLIC BLOOD PRESSURE: 119 MMHG | DIASTOLIC BLOOD PRESSURE: 70 MMHG | WEIGHT: 315 LBS

## 2024-05-14 DIAGNOSIS — M79.644 CHRONIC THUMB PAIN, BILATERAL: ICD-10-CM

## 2024-05-14 DIAGNOSIS — G89.29 CHRONIC PAIN OF LEFT WRIST: ICD-10-CM

## 2024-05-14 DIAGNOSIS — M25.532 CHRONIC PAIN OF LEFT WRIST: ICD-10-CM

## 2024-05-14 DIAGNOSIS — M79.645 CHRONIC THUMB PAIN, BILATERAL: ICD-10-CM

## 2024-05-14 DIAGNOSIS — E78.2 MIXED HYPERLIPIDEMIA: Chronic | ICD-10-CM

## 2024-05-14 DIAGNOSIS — F41.8 MIXED ANXIETY AND DEPRESSIVE DISORDER: Primary | ICD-10-CM

## 2024-05-14 DIAGNOSIS — G89.29 CHRONIC THUMB PAIN, BILATERAL: ICD-10-CM

## 2024-05-14 DIAGNOSIS — G47.33 OBSTRUCTIVE SLEEP APNEA (ADULT) (PEDIATRIC): ICD-10-CM

## 2024-05-14 DIAGNOSIS — Z98.890 S/P CARPAL TUNNEL RELEASE: ICD-10-CM

## 2024-05-14 DIAGNOSIS — E66.01 CLASS 3 SEVERE OBESITY DUE TO EXCESS CALORIES WITH SERIOUS COMORBIDITY AND BODY MASS INDEX (BMI) OF 45.0 TO 49.9 IN ADULT (HCC): ICD-10-CM

## 2024-05-14 PROCEDURE — 99214 OFFICE O/P EST MOD 30 MIN: CPT | Performed by: FAMILY MEDICINE

## 2024-05-14 RX ORDER — BUSPIRONE HYDROCHLORIDE 30 MG/1
30 TABLET ORAL DAILY PRN
Qty: 90 TABLET | Refills: 1
Start: 2024-05-14

## 2024-05-14 RX ORDER — ATORVASTATIN CALCIUM 20 MG/1
20 TABLET, FILM COATED ORAL NIGHTLY
Qty: 90 TABLET | Refills: 3 | Status: SHIPPED | OUTPATIENT
Start: 2024-05-14

## 2024-05-14 ASSESSMENT — ENCOUNTER SYMPTOMS
SORE THROAT: 0
NAUSEA: 0
ABDOMINAL PAIN: 0

## 2024-05-14 NOTE — PROGRESS NOTES
[facility]  [facility] PRIMARY CARE  [address] [place] [address]  Dept: [phone]  Dept Fax: [phone]     5/14/2024      Renny Meli   1995     Chief Complaint   Patient presents with    Medication Check     Mental health        HPI  Pt comes in today for follow up. Has been only using the BUSPAR daily as needed, on average about once per week. Overall he is feeling very good with mental health. Feels that meeting with Dr Doty. Has been working on trying to modify dietary changes. In last month increasing activity level. Has still been dealing with some hand issues, had a specialist before, but no longer there. Would like re-eval. At this time has mostly constant aching pain L hand/forearm pain and bilateral thumb pain.     Wt Readings from Last 5 Encounters:   05/14/24 (!) 157.6 kg (347 lb 6.4 oz)   12/05/23 (!) 164.2 kg (362 lb)   11/16/23 (!) 164.1 kg (361 lb 12.8 oz)   08/31/23 (!) 157.9 kg (348 lb)   07/07/23 (!) 157.9 kg (348 lb)         5/11/2024     7:02 AM 3/7/2023     8:10 AM   PHQ Scores   PHQ2 Score 1 6   PHQ9 Score 2 12     Interpretation of Total Score Depression Severity: 1-4 = Minimal depression, 5-9 = Mild depression, 10-14 = Moderate depression, 15-19 = Moderately severe depression, 20-27 = Severe depression     Prior to Visit Medications    Medication Sig Taking? Authorizing Provider   busPIRone (BUSPAR) 30 MG tablet TAKE 1 TABLET BY MOUTH DAILY PRN ANXIETY Yes Chris Carver,    citalopram (CELEXA) 20 MG tablet TAKE 1 TABLET BY MOUTH DAILY Yes Chris Carver DO       Past Medical History:   Diagnosis Date    History of panic attacks 03/07/2023    Mixed anxiety and depressive disorder 11/14/2019    Mixed hyperlipidemia 03/07/2023    Obstructive sleep apnea (adult) (pediatric) 11/16/2023    Sleep apnea with use of continuous positive airway pressure (CPAP) 11/16/2023        Social History     Tobacco Use

## 2024-05-14 NOTE — PATIENT INSTRUCTIONS
[facility] Locations - No appointment necessary.  ? indicates the location is open Saturdays in addition to Monday through Friday.   Call your preferred location for test preparation, business hours and other information you need.   [facility] accepts all insurances.  CENTRAL  EAST  [place]    ? [place]   [address]   [address]   [place] [address]    Ph: [phone]  [facility]   [address] [place] [address]    Ph: [phone]   ? [place]   [address]    [place] [address]    Ph: [phone]     [facility]   [address] [place] [address]    Ph: [phone] ? [address] [place] [address]   Ph: [phone]  ? [facility]   [address] [place] [address]    Ph: [phone]      [place]   [address] [place] [address]   Ph: [phone]    NORTH    ? [facility]   [address] [place] [address]    Ph: [phone]  [facility]   [address] [place] [address]   Ph: [phone]  [place]   [address], [address]    PH: [phone]    [facility]   [address] [place] [address]    Ph: [phone]  [place]  [address] [place] [address]  Ph: [phone]  [facility]   [address] [place] [address]    Ph: [phone]

## 2024-05-29 ENCOUNTER — OFFICE VISIT (OUTPATIENT)
Dept: ORTHOPEDIC SURGERY | Age: 29
End: 2024-05-29

## 2024-05-29 VITALS — WEIGHT: 315 LBS | RESPIRATION RATE: 16 BRPM | HEIGHT: 70 IN | BODY MASS INDEX: 45.1 KG/M2

## 2024-05-29 DIAGNOSIS — M25.532 LEFT WRIST PAIN: Primary | ICD-10-CM

## 2024-05-29 SDOH — HEALTH STABILITY: PHYSICAL HEALTH: ON AVERAGE, HOW MANY DAYS PER WEEK DO YOU ENGAGE IN MODERATE TO STRENUOUS EXERCISE (LIKE A BRISK WALK)?: 4 DAYS

## 2024-05-29 SDOH — HEALTH STABILITY: PHYSICAL HEALTH: ON AVERAGE, HOW MANY MINUTES DO YOU ENGAGE IN EXERCISE AT THIS LEVEL?: 20 MIN

## 2024-05-29 NOTE — PROGRESS NOTES
Mr. Renny Garrison is a 28 y.o. right handed man  who is seen today in Hand Surgical Consultation at the request of Chris Carver DO.    He is seen today regarding a 3 year(s) history of left wrist globalized pain without history of previous injury.  He was seen for this concern by his primary care physician; previous treatment has included prior Carpal Tunnel Release and trigger finger release done elsewhere.  He reports generalized and nonspecific, intermittent pain located in the circumferential wrist, no tenderness of the remaining hand or elbow.  He  notes today, no neurologic symptoms in the hand. Symptoms show no change over time.       I have today reviewed with Renny Garrison the clinically relevant, past medical history, medications, allergies,  family history, social history, and Review Of Systems & I have documented any details relevant to today's presenting complaints in my history above.  Mr. Renny Garrison's self-reported past medical history, medications, allergies,  family history, social history, and Review Of Systems have been scanned into the chart under the \"Media\" tab.    Physical Exam:  Mr. Renny Garrison's most recent vitals:  Vitals  Respirations: 16  Height: 177.8 cm (5' 10\")  Weight - Scale: (!) 157.4 kg (347 lb)    He is well nourished, oriented to person, place & time.  He demonstrates appropriate mood and affect as well as normal gait and station.    Skin: Normal in appearance, Normal Color, and Free of Lesions Bilaterally  Prior incisions are fully healed, non tender and without hypersensitivity.   Digital range of motion is without significant limitation bilaterally  Wrist range of motion is equal bilateral   Sensation is subjectively present in the Whole Hand.  All other digits are normally sensate bilaterally  Vascular examination reveals normal and good capillary refill bilaterally.   There is no ecchymosis bilaterally.  Swelling is absent in the Radial, Ulnar,

## 2024-06-03 ENCOUNTER — TELEMEDICINE (OUTPATIENT)
Age: 29
End: 2024-06-03
Payer: COMMERCIAL

## 2024-06-03 DIAGNOSIS — F41.1 GENERALIZED ANXIETY DISORDER: Primary | ICD-10-CM

## 2024-06-03 PROCEDURE — 90832 PSYTX W PT 30 MINUTES: CPT | Performed by: PSYCHOLOGIST

## 2024-06-03 NOTE — PROGRESS NOTES
Behavioral Health Consultation  Lorena Johnson, Ph.D.  Clinical Psychologist  6/3/2024  8:30 AM  Name: Renny Garrison  YOB: 1995  PCP: Chris Carver, DO     TELEHEALTH VISIT -- Audio/Visual (During COVID-19 public health emergency)  Time spent with Renny: 30 minutes  This is his seventh [facility] appointment.  Reason for Consult: Anxiety     S:  Renny is a 28 y.o. male seen for [facility] follow up visit addressing ANN, healthier eating.  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  He thinks they'll continue to be friends.  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  He continues some house projects, got new plants for his sunroom.    Regarding recent health enhancing bx - he is walking (4x/week), motivated to walk more.  Cooking about 1-2x/week, says he takes a photo of what he cooks for reinforcement.      He is motivated to work on bedtime routine - reduce evening screen time and have better bedtime routine.  He has fairly consistently gone to bed around 11pm (goal) and even earlier some days.  Wants to plan for writing, brushing teeth, etc before bed and reduce phone use to wind down.  Discussed \"establishing a system\" and maintaining changes.  Discussed setting self up for writing with accessible supplies.  Already has built in phone reminders for night medication and reminder to stop watching and go to bed.     O:  MSE:  Appearance: good hygiene   Attitude: cooperative and friendly  Consciousness: alert  Orientation: oriented to person, place, time, general circumstance  Memory: recent and remote memory intact  Attention/Concentration: intact during session  Psychomotor Activity: normal  Eye Contact: normal  Speech: normal rate and volume, well-articulated  Mood: anxious, improving  Affect: anxious, improving  electronic

## 2024-06-24 ENCOUNTER — TELEMEDICINE (OUTPATIENT)
Age: 29
End: 2024-06-24
Payer: COMMERCIAL

## 2024-06-24 DIAGNOSIS — F41.1 GENERALIZED ANXIETY DISORDER: Primary | ICD-10-CM

## 2024-06-24 PROCEDURE — 90832 PSYTX W PT 30 MINUTES: CPT | Performed by: PSYCHOLOGIST

## 2024-06-24 NOTE — PROGRESS NOTES
Year: Not on file     Number of Places Lived in the Last Year: Not on file     Unstable Housing in the Last Year: No     A:  Administered PHQ-9 (see below).  Denies SI/HI.       5/11/2024     7:02 AM 3/7/2023     8:10 AM   PHQ Scores   PHQ2 Score 1 6   PHQ9 Score 2 12   Interpretation of Total Score Depression Severity: 1-4 = Minimal depression, 5-9 = Mild depression, 10-14 = Moderate depression, 15-19 = Moderately severe depression, 20-27 = Severe depression    Diagnosis:  1. Generalized anxiety disorder      Plan:  Interventions  Collaboratively discussed recent stressors, provided support and validation.  Reviewed progress and provided praise for effective coping.  Discussed \"establishing a system\" and maintaining changes.  He identifies benefit from ongoing accountability and maintaining a system of reminders.      Behavioral Change Plan  See above.  Patient identified the following goals for this week:    Schedule groceries for Sunday mornings.  Generate more healthy restaurant and selections he would enjoy on in office days.  Associate a new tv show with using treadmill.  Follow up with Dr. Johnson in 3 weeks, sooner if needed.    Return in 16 days (on 7/10/2024).        Verified the following patient information:  Identification: Yes  Location: [address] [place] [address]   Call back number: [phone]   Emergency contact's name and number, as well as permission to contact them if needed: Extended Emergency Contact Information  Primary Emergency Contact: YUMIKOBRUNILDA  Home Phone: [phone]  Relation: Parent  Secondary Emergency Contact: YUMIKONELSY  Home Phone: [phone]  Relation: Parent   Provider location:  [facility]    Renny Garrison, was evaluated through a synchronous (real-time) audio-video encounter. The patient (or guardian if applicable) is aware that this is a billable service, which includes applicable co-pays. This Virtual Visit was conducted with patient's

## 2024-07-10 ENCOUNTER — TELEMEDICINE (OUTPATIENT)
Age: 29
End: 2024-07-10
Payer: COMMERCIAL

## 2024-07-10 DIAGNOSIS — F41.1 GENERALIZED ANXIETY DISORDER: Primary | ICD-10-CM

## 2024-07-10 PROCEDURE — 90832 PSYTX W PT 30 MINUTES: CPT | Performed by: PSYCHOLOGIST

## 2024-07-10 NOTE — PROGRESS NOTES
Behavioral Health Consultation  Lorena Johnson, Ph.D.  Clinical Psychologist  7/10/2024  8:30 AM  Name: Renny Garrison  YOB: 1995  PCP: Chris Carver, DO     TELEHEALTH VISIT -- Audio/Visual (During COVID-19 public health emergency)  Time spent with Renny: 30 minutes  This is his ninth [facility] appointment.  Reason for Consult: Anxiety     S:  Renny is a 29 y.o. male seen for [facility] follow up visit addressing ANN, healthier eating.  Feeling anxious which he attributes to rough night sleep with \"poor seal on my cpap last night.\"  Overall anxiety has been well managed.  He is glad to have a slow work day today, encouraged him to engage in anxiety mgmt strategies today.    Re-evaluated patient's current goals - re: health-enhancing behaviors he describes \"slipping\" and wants to simplify and set small goals today focusing primarily on setting self up for success with healthy eating and cooking.  He still thinks Sunday mornings are best times for grocery shopping - primary barriers are motivation to go as planned and making the meals he buys groceries for - some benefit from using the [employer] recipe list for ease of planning and is primarily using new recipes, likes going inside grocery.  When he has bulk meals made at home he does typically eat them and reduces food delivery and overeating.  Does not wish to set physical activity goal today but hopes to walk for exercise and find show he might watch while on treadmill.     O:  MSE:  Appearance: good hygiene   Attitude: cooperative and friendly  Consciousness: alert  Orientation: oriented to person, place, time, general circumstance  Memory: recent and remote memory intact  Attention/Concentration: intact during session  Psychomotor Activity: normal  Eye Contact: normal  Speech: normal rate and volume, well-articulated  Mood: anxious   Affect: anxious   Perception: within normal limits  Thought Content: within normal limits  Thought Process:

## 2024-07-24 ENCOUNTER — TELEMEDICINE (OUTPATIENT)
Age: 29
End: 2024-07-24
Payer: COMMERCIAL

## 2024-07-24 DIAGNOSIS — F41.1 GENERALIZED ANXIETY DISORDER: Primary | ICD-10-CM

## 2024-07-24 PROCEDURE — 90832 PSYTX W PT 30 MINUTES: CPT | Performed by: PSYCHOLOGIST

## 2024-07-24 NOTE — PROGRESS NOTES
12   Interpretation of Total Score Depression Severity: 1-4 = Minimal depression, 5-9 = Mild depression, 10-14 = Moderate depression, 15-19 = Moderately severe depression, 20-27 = Severe depression    Diagnosis:  1. Generalized anxiety disorder      Plan:  Interventions  Collaboratively discussed recent stressors, provided support and validation.  Reviewed progress.  Explored alternative ways of thinking about his feelings and thoughts - psychoed re: cognitive distortions and impact on feelings - validated patient's emotional experience, ways emotions can be cues.      Behavioral Change Plan  See above.  Patient identified the following goals:    Schedule groceries for Brian morning.  Plans to walk on treadmill at least 1x this week.   Follow up with Dr. Johnson in 2 weeks, sooner if needed.    Return in 2 weeks (on 8/7/2024).        Verified the following patient information:  Identification: Yes  Location: [address] [place] [address]   Call back number: [phone]   Emergency contact's name and number, as well as permission to contact them if needed: Extended Emergency Contact Information  Primary Emergency Contact: BRUNILDA CALDERON  Home Phone: [phone]  Relation: Parent  Secondary Emergency Contact: NELSY CALDERON  Home Phone: [phone]  Relation: Parent   Provider location:  [facility]    Renny Calderon, was evaluated through a synchronous (real-time) audio-video encounter. The patient (or guardian if applicable) is aware that this is a billable service, which includes applicable co-pays. This Virtual Visit was conducted with patient's (and/or legal guardian's) consent.  Patient identification was verified, and a caregiver was present when appropriate.     Electronically signed by Lorena Johnson, PhD on 7/24/2024 at 3:01 PM     An electronic signature was used to authenticate this note.     Documentation was done using voice recognition dragon software.  Every effort was made

## 2024-08-14 ENCOUNTER — TELEMEDICINE (OUTPATIENT)
Age: 29
End: 2024-08-14
Payer: COMMERCIAL

## 2024-08-14 DIAGNOSIS — F41.1 GENERALIZED ANXIETY DISORDER: Primary | ICD-10-CM

## 2024-08-14 PROCEDURE — 90832 PSYTX W PT 30 MINUTES: CPT | Performed by: PSYCHOLOGIST

## 2024-08-14 NOTE — PROGRESS NOTES
Behavioral Health Consultation  Lorena Johnson, Ph.D.  Clinical Psychologist  8/14/2024  9:03 AM  Name: Renny Garrison  YOB: 1995  PCP: Chris Carver, DO     TELEHEALTH VISIT -- Audio/Visual (During COVID-19 public health emergency)  Time spent with Renny: 30 minutes  This is his  11th  [facility] appointment.  Reason for Consult: Anxiety     S:  Renny is a 29 y.o. male seen for [facility] follow up visit addressing ANN, healthier eating.  Overall patient describes maintaining improvement in anxiety mgmt - able to maintain improved mood through recent stressors related to work and times when he was busier and having less activity.  Recently a coworker retired - this shifted some responsibility that he was not anticipating - says this has changed his weekly work priorities and worked 50 hrs last week.  Notes urge to look for other jobs, was fatigued and laid in bed after work when he would typically try to cook.  Discussed cycle of anxiety and avoidance.  Increased work hours/responsibility had impact on feasibility of keeping up healthy changes related to eating as well as keeping up household tasks.  Some walking on treadmill.  Patient expresses interest in meeting with a nutritionist - discussed resources potentially available through clinical pharmacist and he would like referral.      O:  MSE:  Appearance: good hygiene   Attitude: cooperative and friendly  Consciousness: alert  Orientation: oriented to person, place, time, general circumstance  Memory: recent and remote memory intact  Attention/Concentration: intact during session  Psychomotor Activity: normal  Eye Contact: normal  Speech: normal rate and volume, well-articulated  Mood: congruent to topic  Affect: congruent   Perception: within normal limits  Thought Content: within normal limits  Thought Process: logical, coherent and goal-directed  Insight: good  Judgment: intact  Ability to understand instructions: Yes  Ability to respond

## 2024-08-21 ENCOUNTER — TELEPHONE (OUTPATIENT)
Dept: PHARMACY | Age: 29
End: 2024-08-21

## 2024-08-21 DIAGNOSIS — E78.2 MIXED HYPERLIPIDEMIA: Primary | Chronic | ICD-10-CM

## 2024-08-21 NOTE — TELEPHONE ENCOUNTER
----- Message from Lorena RAMOS sent at 8/15/2024  3:19 PM EDT -----  Regarding: RE: Possible referral  Great - Patient expressed interest in education/counseling only.  He responds well to structure and specific recommendations and thought seeing you would be good motivation to make more consistent change.  ----- Message -----  From: Vera Mazariegos  Sent: 8/15/2024  11:05 AM EDT  To: Lorena Johnson, PhD  Subject: RE: Possible referral                            Sure!  Are you wanting med adjustments or just education/counseling?    If med adjustments, I can get a referral from Dannielle since I have a collaborative agreement with him.  ----- Message -----  From: Lorena Johnson, PhD  Sent: 8/14/2024   9:15 AM EDT  To: Vera Mazariegos  Subject: Possible referral                                  Yordy Escudero,     This is a patient I see who has been working on healthier eating for quite some time and really wants to meet with you if he is a candidate for referral.  Current conditions include obesity, sleep apnea, hyperlipidemia - would that meet criteria to see you?  If so, can I direct refer him to you?    Thank you,   Lorena

## 2024-08-21 NOTE — TELEPHONE ENCOUNTER
Pt scheduled on 9/4 for initial visit.     Kena Mazariegos, PharmD, BCACP  [facility]   [facility] [place] Ph: [phone]  [facility] Gabriella Ph: [phone]  8/21/2024 5:13 PM

## 2024-08-26 ENCOUNTER — OFFICE VISIT (OUTPATIENT)
Dept: PRIMARY CARE CLINIC | Age: 29
End: 2024-08-26
Payer: COMMERCIAL

## 2024-08-26 VITALS
HEIGHT: 70 IN | DIASTOLIC BLOOD PRESSURE: 70 MMHG | WEIGHT: 315 LBS | SYSTOLIC BLOOD PRESSURE: 115 MMHG | TEMPERATURE: 97.8 F | HEART RATE: 108 BPM | BODY MASS INDEX: 45.1 KG/M2 | OXYGEN SATURATION: 94 %

## 2024-08-26 DIAGNOSIS — J06.9 VIRAL UPPER RESPIRATORY TRACT INFECTION: Primary | ICD-10-CM

## 2024-08-26 PROCEDURE — 99213 OFFICE O/P EST LOW 20 MIN: CPT | Performed by: NURSE PRACTITIONER

## 2024-08-26 ASSESSMENT — ENCOUNTER SYMPTOMS
ABDOMINAL PAIN: 0
SINUS PRESSURE: 1
SORE THROAT: 1
SINUS PAIN: 0
COUGH: 1
SHORTNESS OF BREATH: 0
WHEEZING: 0

## 2024-08-26 NOTE — PROGRESS NOTES
[facility]  [facility] PRIMARY CARE  [address] [place] [address]  Dept: [phone]  Dept Fax: [phone]     8/26/2024      Renny Cunhaignacia   1995     Chief Complaint   Patient presents with    Other     Sore throat sinus started Saturday night , covid-19 test negative        HPI     Patient presents for acute illness. He is an established patient of Dr. Chris Carver. He states Saturday he started with a sore throat and sinus pressure and headache. He states this morning he felt a little worse. He denies any fevers, body aches, fatigue. He took a home covid test which was negative. He works from home Mondays and Wednesdays but is wanting to get evaluated before going in to work tomorrow.           5/11/2024     7:02 AM 3/7/2023     8:10 AM   PHQ Scores   PHQ2 Score 1 6   PHQ9 Score 2 12     Interpretation of Total Score Depression Severity: 1-4 = Minimal depression, 5-9 = Mild depression, 10-14 = Moderate depression, 15-19 = Moderately severe depression, 20-27 = Severe depression     Prior to Visit Medications    Medication Sig Taking? Authorizing Provider   busPIRone (BUSPAR) 30 MG tablet Take 30 mg by mouth daily as needed (Anxiety) Yes Chris Carver DO   atorvastatin (LIPITOR) 20 MG tablet Take 1 tablet by mouth nightly Yes Chris Carver DO   citalopram (CELEXA) 20 MG tablet TAKE 1 TABLET BY MOUTH DAILY Yes Chris Carver DO       Past Medical History:   Diagnosis Date    History of panic attacks 03/07/2023    Mixed anxiety and depressive disorder 11/14/2019    Mixed hyperlipidemia 03/07/2023    Obstructive sleep apnea (adult) (pediatric) 11/16/2023    Sleep apnea with use of continuous positive airway pressure (CPAP) 11/16/2023        Social History     Tobacco Use    Smoking status: Never     Passive exposure: Never    Smokeless tobacco: Never   Substance Use Topics    Alcohol use: Yes     Comment: rarely    Drug use: Never

## 2024-09-04 ENCOUNTER — PHARMACY VISIT (OUTPATIENT)
Dept: PHARMACY | Age: 29
End: 2024-09-04
Payer: COMMERCIAL

## 2024-09-04 DIAGNOSIS — E78.2 MIXED HYPERLIPIDEMIA: Chronic | ICD-10-CM

## 2024-09-04 DIAGNOSIS — E66.01 CLASS 3 SEVERE OBESITY DUE TO EXCESS CALORIES WITH SERIOUS COMORBIDITY AND BODY MASS INDEX (BMI) OF 45.0 TO 49.9 IN ADULT (HCC): Primary | ICD-10-CM

## 2024-09-04 PROCEDURE — 99212 OFFICE O/P EST SF 10 MIN: CPT

## 2024-09-04 NOTE — PATIENT INSTRUCTIONS
Sunday morning     --6-8 eggs/egg whites + 1 c plain greek yogurt + 1 cup cheese + 1-3 c veggies (frozen spinach) + ham/mercer-- Bake 350 30-45 min -- Whole wheat english muffin   --Oatmeal + powdered PB + cinnamon '+ vanilla + honey + nuts  + hard boiled egg  --yogurt?     Lunch- salad kits (half dressing), Ole xtreme wellness high fiber + turkey/ham/cheese + fresh spinach + small fruit,  greek yogurt + berries/grapes      Dinner- portion/save half and add bag of frozen veggies    Snack/side  Greek yogurt- oikos triple zero, TWO good, light and fit greek  + nuts  Pure protein bars    One new recipe per week.   At least 3 bottles water per week.

## 2024-09-04 NOTE — PROGRESS NOTES
CLINICAL PHARMACY NOTE--Diabetes    Renny Garrison is a 29 y.o. male with PMHX significant for anxiety/depression, SUSANNE (CPAP use), HLD and Obesity referred by Lorena Cervantes, PhD for dietary education/counseling for weight loss.       ASSESSMENT/PLAN:    Pt is highly motivated to change his eating habits and make more consistent lifestyle changes.  Per his request, we discussed specific changes and meal prep ideas that he can start incorporating into his diet.     Goals set:  Schedule time on Sunday mornings to make a grocery list and go to the store.   Try one new recipe per week   Increase water- 3 large water bottles per day  Start packing lunch every day instead of getting take out.   For dinner, divide take-out over 2 nights, add bag of frozen veggies with meal.     Meal/snack ideas discussed  --mini crustless quiche + whole wheat english muffin or small pc fruit.   --plain oatmeal + cinnamon/nuts + hard boiled egg  --low sugar greek yogurt  --salad kits (half dressing), Ole xtreme wellness high fiber wrap + turkey/ham/cheese + fresh spinach + small fruit  --greek yogurt + berries  --Pure protein bars    Labs recommended: UACR, vit D, a1c    Next visit: focus on activity goals     Health Maintenance Due   Topic Date Due    DTaP/Tdap/Td vaccine (1 - Tdap) Never done    Flu vaccine (1) 08/01/2024    COVID-19 Vaccine (5 - 2023-24 season) 09/01/2024       Return in about 2 weeks (around 9/18/2024). --He would like regular visits for accountability.         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 Weight gradually increased to 320# and was able to get back down to 290#, but then COVID hit and he trended back up. He had a 1 hr commute to work. A lot of sitting in car.  Lives alone. Gets a lot of take out. Now WFH 2x per week and 30 min commute.     Eating habits:  Has a \"bad relationship with door dash.\" Lost motivation

## 2024-09-05 VITALS — SYSTOLIC BLOOD PRESSURE: 117 MMHG | HEART RATE: 88 BPM | DIASTOLIC BLOOD PRESSURE: 74 MMHG

## 2024-09-05 DIAGNOSIS — E66.01 CLASS 3 SEVERE OBESITY DUE TO EXCESS CALORIES WITH SERIOUS COMORBIDITY AND BODY MASS INDEX (BMI) OF 45.0 TO 49.9 IN ADULT (HCC): Primary | ICD-10-CM

## 2024-09-16 ENCOUNTER — PHARMACY VISIT (OUTPATIENT)
Dept: PHARMACY | Age: 29
End: 2024-09-16
Payer: COMMERCIAL

## 2024-09-16 VITALS
WEIGHT: 315 LBS | SYSTOLIC BLOOD PRESSURE: 110 MMHG | DIASTOLIC BLOOD PRESSURE: 71 MMHG | BODY MASS INDEX: 48.54 KG/M2 | HEART RATE: 79 BPM

## 2024-09-16 DIAGNOSIS — E66.01 CLASS 3 SEVERE OBESITY DUE TO EXCESS CALORIES WITH SERIOUS COMORBIDITY AND BODY MASS INDEX (BMI) OF 45.0 TO 49.9 IN ADULT (HCC): Primary | ICD-10-CM

## 2024-09-16 PROCEDURE — 99211 OFF/OP EST MAY X REQ PHY/QHP: CPT

## 2024-10-21 ENCOUNTER — PHARMACY VISIT (OUTPATIENT)
Dept: PHARMACY | Age: 29
End: 2024-10-21
Payer: COMMERCIAL

## 2024-10-21 VITALS — WEIGHT: 315 LBS | BODY MASS INDEX: 47.55 KG/M2

## 2024-10-21 DIAGNOSIS — E66.813 CLASS 3 SEVERE OBESITY DUE TO EXCESS CALORIES WITH SERIOUS COMORBIDITY AND BODY MASS INDEX (BMI) OF 45.0 TO 49.9 IN ADULT: Primary | ICD-10-CM

## 2024-10-21 DIAGNOSIS — E66.01 CLASS 3 SEVERE OBESITY DUE TO EXCESS CALORIES WITH SERIOUS COMORBIDITY AND BODY MASS INDEX (BMI) OF 45.0 TO 49.9 IN ADULT: Primary | ICD-10-CM

## 2024-10-21 PROCEDURE — 99212 OFFICE O/P EST SF 10 MIN: CPT

## 2024-10-21 NOTE — PATIENT INSTRUCTIONS
Remove uber eats xochilt from phone  Allow yourself 2 pc pizza once per week  Continue to work on water intake at home.   Try fish from [employer]  Kebobs, turkey burger, salmon chinmay  Stir berry with riced cauliflower with eggs  Healthy Chipotle Sweet Potato Skins  Stuffed peppers- ground turkey, corn, peppers, riced cauliflower, salsa, cheese, or stuffed pepper skillet  Wrap- turkey, craisins, fresh spinach

## 2024-10-21 NOTE — PROGRESS NOTES
CLINICAL PHARMACY NOTE--Diabetes    Renny Garrison is a 29 y.o. male with PMHX significant for anxiety/depression, SUSANNE (CPAP use), HLD and Obesity referred by Lorena Cervantes, PhD for dietary education/counseling for weight loss.     ASSESSMENT/PLAN: Pt has lost 7 lbs in the past month! Reported \"slipping\" for a few weeks but has gotten back on track - picked up a pizza on a Friday -> 1-2 weeks of eating out/ not meal prepping. Had been getting tired of meal prep food. Has been buying gadgets to encourage cooking at home. Pt remains highly motivated to change his eating habits and has made significant positive changes. He wants to make sure these remain consistent lifestyle changes.  Per his request, we discussed specific changes and meal prep ideas that he can incorporate into his diet.     Goals set at previous visit:  Walk during lunch break at work. Discussed additional benefit on mental health.   Add one pack of propel to 30 oz water bottle instead of 16 oz  Incorporate water into home routine    New goals:  Remove uber eats xochilt from phone  Try 1 new recipe per week  Allow yourself 2 pc pizza once per week  Continue to work on water intake at home.   Continue walking/ find walking sissy    Meal/snack ideas previously discussed:  --mini crustless quiche + whole wheat english muffin or small pc fruit.   --plain oatmeal + cinnamon/nuts + hard boiled egg  --low sugar greek yogurt  --salad kits (half dressing), Ole xtreme wellness high fiber wrap + turkey/ham/cheese + fresh spinach + small fruit  --greek yogurt + berries  --Pure protein bars  --Chicken chili soup- extra veggies, fresh salsa  --Cauli rice/stir berry- scrambled eggs +chicken   --one pan sausage and veggies      New recipes:   Try fish from [employer]  Kebobs, turkey burger, salmon chinmay  Stir berry with riced cauliflower with eggs  Healthy Chipotle Sweet Potato Skins  Stuffed peppers- ground turkey, corn, peppers, riced cauliflower, salsa,

## 2024-11-08 DIAGNOSIS — F41.8 MIXED ANXIETY AND DEPRESSIVE DISORDER: ICD-10-CM

## 2024-11-08 RX ORDER — CITALOPRAM HYDROBROMIDE 20 MG/1
20 TABLET ORAL DAILY
Qty: 90 TABLET | Refills: 1 | Status: SHIPPED | OUTPATIENT
Start: 2024-11-08 | End: 2025-02-06

## 2024-11-08 RX ORDER — BUSPIRONE HYDROCHLORIDE 30 MG/1
30 TABLET ORAL DAILY
Qty: 90 TABLET | Refills: 1 | Status: SHIPPED | OUTPATIENT
Start: 2024-11-08

## 2024-11-16 DIAGNOSIS — E78.2 MIXED HYPERLIPIDEMIA: Chronic | ICD-10-CM

## 2024-11-16 DIAGNOSIS — E66.01 CLASS 3 SEVERE OBESITY DUE TO EXCESS CALORIES WITH SERIOUS COMORBIDITY AND BODY MASS INDEX (BMI) OF 45.0 TO 49.9 IN ADULT: ICD-10-CM

## 2024-11-16 DIAGNOSIS — E66.813 CLASS 3 SEVERE OBESITY DUE TO EXCESS CALORIES WITH SERIOUS COMORBIDITY AND BODY MASS INDEX (BMI) OF 45.0 TO 49.9 IN ADULT: ICD-10-CM

## 2024-11-16 LAB
25(OH)D3 SERPL-MCNC: 21.8 NG/ML
ALBUMIN SERPL-MCNC: 4.6 G/DL (ref 3.4–5)
ALBUMIN/GLOB SERPL: 1.9 {RATIO} (ref 1.1–2.2)
ALP SERPL-CCNC: 108 U/L (ref 40–129)
ALT SERPL-CCNC: 41 U/L (ref 10–40)
ANION GAP SERPL CALCULATED.3IONS-SCNC: 11 MMOL/L (ref 3–16)
AST SERPL-CCNC: 27 U/L (ref 15–37)
BILIRUB SERPL-MCNC: 0.5 MG/DL (ref 0–1)
BUN SERPL-MCNC: 14 MG/DL (ref 7–20)
CALCIUM SERPL-MCNC: 9.6 MG/DL (ref 8.3–10.6)
CHLORIDE SERPL-SCNC: 103 MMOL/L (ref 99–110)
CHOLEST SERPL-MCNC: 157 MG/DL (ref 0–199)
CO2 SERPL-SCNC: 23 MMOL/L (ref 21–32)
CREAT SERPL-MCNC: 0.9 MG/DL (ref 0.9–1.3)
CREAT UR-MCNC: 139 MG/DL (ref 39–259)
GFR SERPLBLD CREATININE-BSD FMLA CKD-EPI: >90 ML/MIN/{1.73_M2}
GLUCOSE SERPL-MCNC: 82 MG/DL (ref 70–99)
HDLC SERPL-MCNC: 36 MG/DL (ref 40–60)
LDL CHOLESTEROL: 104 MG/DL
MICROALBUMIN UR DL<=1MG/L-MCNC: <1.2 MG/DL
MICROALBUMIN/CREAT UR: NORMAL MG/G (ref 0–30)
POTASSIUM SERPL-SCNC: 4.5 MMOL/L (ref 3.5–5.1)
PROT SERPL-MCNC: 7 G/DL (ref 6.4–8.2)
SODIUM SERPL-SCNC: 137 MMOL/L (ref 136–145)
TRIGL SERPL-MCNC: 86 MG/DL (ref 0–150)
VLDLC SERPL CALC-MCNC: 17 MG/DL

## 2024-11-17 LAB
EST. AVERAGE GLUCOSE BLD GHB EST-MCNC: 105.4 MG/DL
HBA1C MFR BLD: 5.3 %

## 2024-11-18 ENCOUNTER — OFFICE VISIT (OUTPATIENT)
Dept: PRIMARY CARE CLINIC | Age: 29
End: 2024-11-18

## 2024-11-18 ENCOUNTER — PHARMACY VISIT (OUTPATIENT)
Dept: PHARMACY | Age: 29
End: 2024-11-18
Payer: COMMERCIAL

## 2024-11-18 VITALS — WEIGHT: 315 LBS | BODY MASS INDEX: 47.69 KG/M2

## 2024-11-18 VITALS
HEART RATE: 108 BPM | BODY MASS INDEX: 44.1 KG/M2 | WEIGHT: 315 LBS | HEIGHT: 71 IN | DIASTOLIC BLOOD PRESSURE: 69 MMHG | OXYGEN SATURATION: 95 % | SYSTOLIC BLOOD PRESSURE: 103 MMHG | TEMPERATURE: 98.6 F

## 2024-11-18 DIAGNOSIS — E66.01 CLASS 3 SEVERE OBESITY DUE TO EXCESS CALORIES WITH SERIOUS COMORBIDITY AND BODY MASS INDEX (BMI) OF 45.0 TO 49.9 IN ADULT: ICD-10-CM

## 2024-11-18 DIAGNOSIS — E66.01 CLASS 3 SEVERE OBESITY DUE TO EXCESS CALORIES WITH SERIOUS COMORBIDITY AND BODY MASS INDEX (BMI) OF 45.0 TO 49.9 IN ADULT: Primary | ICD-10-CM

## 2024-11-18 DIAGNOSIS — F32.A DEPRESSION, UNSPECIFIED DEPRESSION TYPE: ICD-10-CM

## 2024-11-18 DIAGNOSIS — F41.1 GENERALIZED ANXIETY DISORDER: ICD-10-CM

## 2024-11-18 DIAGNOSIS — Z00.00 ANNUAL PHYSICAL EXAM: Primary | ICD-10-CM

## 2024-11-18 DIAGNOSIS — Z23 FLU VACCINE NEED: ICD-10-CM

## 2024-11-18 DIAGNOSIS — E66.813 CLASS 3 SEVERE OBESITY DUE TO EXCESS CALORIES WITH SERIOUS COMORBIDITY AND BODY MASS INDEX (BMI) OF 45.0 TO 49.9 IN ADULT: ICD-10-CM

## 2024-11-18 DIAGNOSIS — E78.2 MIXED HYPERLIPIDEMIA: ICD-10-CM

## 2024-11-18 DIAGNOSIS — Z23 NEED FOR TDAP VACCINATION: ICD-10-CM

## 2024-11-18 DIAGNOSIS — E78.2 MIXED HYPERLIPIDEMIA: Chronic | ICD-10-CM

## 2024-11-18 DIAGNOSIS — E66.813 CLASS 3 SEVERE OBESITY DUE TO EXCESS CALORIES WITH SERIOUS COMORBIDITY AND BODY MASS INDEX (BMI) OF 45.0 TO 49.9 IN ADULT: Primary | ICD-10-CM

## 2024-11-18 DIAGNOSIS — E55.9 VITAMIN D DEFICIENCY: ICD-10-CM

## 2024-11-18 PROCEDURE — 99211 OFF/OP EST MAY X REQ PHY/QHP: CPT

## 2024-11-18 ASSESSMENT — ENCOUNTER SYMPTOMS
EYE PAIN: 0
SHORTNESS OF BREATH: 0
SORE THROAT: 0
ABDOMINAL PAIN: 0
NAUSEA: 0
VOMITING: 0
CONSTIPATION: 0
EYE ITCHING: 0
DIARRHEA: 0
WHEEZING: 0
COUGH: 0

## 2024-11-18 NOTE — PROGRESS NOTES
[facility]  [facility]  [address] [place] [id number]  Dept: [phone]  Dept Fax: [phone]     11/18/2024      Renny Meli   1995     Chief Complaint   Patient presents with    Annual Exam       HPI  Pt comes in today for 6 months f/u and physical. Completed labs. Started on lipitor and cholesterol markedly improved. Has been working with lifestyle changes and losing weight.     Wt Readings from Last 5 Encounters:   11/18/24 (!) 151.4 kg (333 lb 12.8 oz)   11/18/24 (!) 150.8 kg (332 lb 6.4 oz)   10/21/24 (!) 150.3 kg (331 lb 6.4 oz)   09/16/24 (!) 153.5 kg (338 lb 4.8 oz)   08/26/24 (!) 160.5 kg (353 lb 12.8 oz)     Lab Results   Component Value Date    WBC 6.0 02/10/2024    HGB 15.3 02/10/2024    HCT 45.2 02/10/2024    MCV 88.3 02/10/2024     02/10/2024     Lab Results   Component Value Date     11/16/2024    K 4.5 11/16/2024     11/16/2024    CO2 23 11/16/2024    BUN 14 11/16/2024    CREATININE 0.9 11/16/2024    GLUCOSE 82 11/16/2024    CALCIUM 9.6 11/16/2024    BILITOT 0.5 11/16/2024    ALKPHOS 108 11/16/2024    AST 27 11/16/2024    ALT 41 (H) 11/16/2024    LABGLOM >90 11/16/2024    AGRATIO 1.9 11/16/2024     Lab Results   Component Value Date/Time    LABA1C 5.3 11/16/2024 08:32 AM     Lab Results   Component Value Date    HDL 36 (L) 11/16/2024    HDL 33 (L) 02/10/2024     Lab Results   Component Value Date    VLDL 17 11/16/2024    VLDL 40 02/10/2024           5/11/2024     7:02 AM 3/7/2023     8:10 AM   PHQ Scores   PHQ2 Score 1 6   PHQ9 Score 2 12     Interpretation of Total Score Depression Severity: 1-4 = Minimal depression, 5-9 = Mild depression, 10-14 = Moderate depression, 15-19 = Moderately severe depression, 20-27 = Severe depression     Prior to Visit Medications    Medication Sig Taking? Authorizing Provider   citalopram (CELEXA) 20 MG tablet TAKE 1 TABLET BY MOUTH DAILY Yes Chris Carver,    busPIRone

## 2024-11-18 NOTE — PATIENT INSTRUCTIONS
Pack lunch 3 days  Stretch every morning   Keep walking 3 days per week.  [email or web address]   Drink lots of water or tea during holiday parties.   [facility]

## 2024-11-18 NOTE — PROGRESS NOTES
CLINICAL PHARMACY NOTE--Diabetes    Renny Garrison is a 29 y.o. male with PMHX significant for anxiety/depression, SUSANNE (CPAP use), HLD and Obesity referred by Lorena Cervantes, PhD for dietary education/counseling for weight loss.     ASSESSMENT/PLAN: Pt 's weight has remained stable the past month. Endorses a significant amount of stress this month, partially related to the election, but has been good about ensuring 1-2 social outings per week and has a great group of friends for support.  Reported not making some of the changes he wished to, but has been consistent with some other changes made previously (I.e. still drinking tea instead of soda). He admits the hardest thing is packing his lunch for work. He has been going to the grocery, but forgets to bring his lunch into the office 3 days per week. Is walking a couple days per week, and does not want to push this further for fear of injury. Plans to declutter in the basement and get out his treadmill for winter. He played pickleball with friends last weekend. Has been buying gadgets to encourage cooking at home. Pt remains highly motivated to change his eating habits and has made significant positive changes. He wants to make sure these remain consistent lifestyle changes.  Has not been able to try his new recipes yet. He did remove uber eats xochilt, but re-downloaded it. Reviewed labs with pt. Recommend a vit D supplement.     Goals set at previous visit:  Remove uber eats xcohilt from phone  Try 1 new recipe per week  Allow yourself 2 pc pizza once per week  Add one pack of propel to 30 oz water bottle instead of 16 oz  Incorporate water into home routine    New goals:  Pack lunch 3 days  Stretch every morning, continue walking 3 days per week (during lunch break)  Drink lots of water (or some tea) during holiday parties to avoid over-eating  Check out colonel brannon at [place] [employer]    Meal/snack ideas previously discussed:  --mini crustless quiche +

## 2024-12-16 ENCOUNTER — TELEPHONE (OUTPATIENT)
Dept: PHARMACY | Age: 29
End: 2024-12-16

## 2024-12-16 NOTE — TELEPHONE ENCOUNTER
Pt Lvm to r/s appt for one month. Called back and LVM to r/s.     Kena Mazariegos, PharmD, BCACP  [facility]   [facility] Rema Ph: [phone]  [facility] Gabriella Ph: [phone]  12/16/2024 9:58 AM

## 2025-01-01 NOTE — TELEPHONE ENCOUNTER
LVM #2    Kena Mazariegos, PharmD, BCACP  [facility]   [facility] Rema Ph: [phone]  [facility] Gabriella Ph: [phone]  1/1/2025 11:01 AM

## 2025-01-22 NOTE — TELEPHONE ENCOUNTER
R/s June per pt preference.     Kena Mazariegos, PharmD, BCACP  [facility]   [facility] Rema Ph: [phone]  [facility] Gabriella Ph: [phone]  1/22/2025 12:46 PM

## 2025-02-04 ENCOUNTER — PATIENT MESSAGE (OUTPATIENT)
Dept: PRIMARY CARE CLINIC | Age: 30
End: 2025-02-04

## 2025-02-10 ENCOUNTER — TELEMEDICINE (OUTPATIENT)
Age: 30
End: 2025-02-10
Payer: COMMERCIAL

## 2025-02-10 DIAGNOSIS — F41.1 GENERALIZED ANXIETY DISORDER: Primary | ICD-10-CM

## 2025-02-10 PROCEDURE — 90832 PSYTX W PT 30 MINUTES: CPT | Performed by: PSYCHOLOGIST

## 2025-02-10 NOTE — PROGRESS NOTES
Behavioral Health Consultation  Lorena Johnson, Ph.D.  Clinical Psychologist  2/10/2025  9:00 AM  Name: Renny Garrison  YOB: 1995  PCP: Chris Carver, DO     TELEHEALTH VISIT -- Audio/Visual  This is his  12th  [facility] appointment.  Reason for Consult: Anxiety     S:  Renny is a 29 y.o. male seen for [facility] follow up visit addressing ANN.  Overall anxiety has been higher the last few weeks.  States felt like \"whiplash\" in January when had a great trip to [place] then returned to annual review (positive) but assigned increased work responsibility with no pay increase.  Increase in stress related to national politics/news. Returns today concerned with recent tic - building sensation culminating in head turn. Occurs about about 2 days per week, a few times per day when it does occur and appears triggered by anxiety/stress.  Some history of tics in the remote past but more intermittent.  Sleep has been ok but has been staying up a little later recently; getting about 6-7 hrs of sleep, better quality with CPAP.  Hx of noticing more panic attacks when not getting adequate sleep. Targeted CBT strategies to address anxiety and tics.     O:  MSE:  Appearance: good hygiene   Attitude: cooperative and friendly  Consciousness: alert  Orientation: oriented to person, place, time, general circumstance  Memory: recent and remote memory intact  Attention/Concentration: intact during session  Psychomotor Activity: normal  Eye Contact: normal  Speech: normal rate and volume, well-articulated  Mood: anxious  Affect: congruent   Perception: within normal limits  Thought Content: within normal limits  Thought Process: logical, coherent and goal-directed  Insight: good  Judgment: intact  Ability to understand instructions: Yes  Ability to respond meaningfully: Yes  Morbid Ideation: no   Suicide Assessment: no suicidal ideation, plan, or intent  Homicidal Ideation: no    History:  Social History:   Social History

## 2025-03-03 ENCOUNTER — TELEMEDICINE (OUTPATIENT)
Age: 30
End: 2025-03-03
Payer: COMMERCIAL

## 2025-03-03 DIAGNOSIS — F41.1 GENERALIZED ANXIETY DISORDER: Primary | ICD-10-CM

## 2025-03-03 PROCEDURE — 90832 PSYTX W PT 30 MINUTES: CPT | Performed by: PSYCHOLOGIST

## 2025-03-03 NOTE — PROGRESS NOTES
Behavioral Health Consultation  Lorena Johnson, Ph.D.  Clinical Psychologist  3/3/2025  9:30 AM  Name: Renny Garrison  YOB: 1995  PCP: Chris Carver, DO     TELEHEALTH VISIT -- Audio/Visual  This is his  13th  [facility] appointment.  Reason for Consult: Anxiety     S:  Renny is a 29 y.o. male seen for [facility] follow up visit addressing ANN.  Overall anxiety has been higher the last few weeks in response to work stress. He noticed an increase in frequency of tics for a few days after we spoke about them and during a higher stress time at work, has since reduced. He tried voluntary stretching in response to urge building which recently appears to help. Reinforced neutral self-talk related to tics, importance of sleep and relaxation. He would like to reduce focus on tics in future visits unless they worsen.     He feels with increased work stress some activities were \"derailed\" and wants to resume. He identifies goals of going to the grocery, practice meditation, walking for exercise. Does not want to set specific goal amts, wants to focus on momentum; mentions he has [employer] reminders set, plans to adjust the timing. Wants to apply to at least one job.     O:  MSE:  Appearance: good hygiene   Attitude: cooperative and friendly  Consciousness: alert  Orientation: oriented to person, place, time, general circumstance  Memory: recent and remote memory intact  Attention/Concentration: intact during session  Psychomotor Activity: normal  Eye Contact: normal  Speech: normal rate and volume, well-articulated  Mood: anxious  Affect: congruent   Perception: within normal limits  Thought Content: within normal limits  Thought Process: logical, coherent and goal-directed  Insight: good  Judgment: intact  Ability to understand instructions: Yes  Ability to respond meaningfully: Yes  Morbid Ideation: no   Suicide Assessment: no suicidal ideation, plan, or intent  Homicidal Ideation: no    History:  Social

## 2025-03-10 ENCOUNTER — TELEMEDICINE (OUTPATIENT)
Dept: PULMONOLOGY | Age: 30
End: 2025-03-10
Payer: COMMERCIAL

## 2025-03-10 DIAGNOSIS — E66.813 CLASS 3 SEVERE OBESITY DUE TO EXCESS CALORIES WITH SERIOUS COMORBIDITY AND BODY MASS INDEX (BMI) OF 45.0 TO 49.9 IN ADULT: ICD-10-CM

## 2025-03-10 DIAGNOSIS — E66.01 CLASS 3 SEVERE OBESITY DUE TO EXCESS CALORIES WITH SERIOUS COMORBIDITY AND BODY MASS INDEX (BMI) OF 45.0 TO 49.9 IN ADULT: ICD-10-CM

## 2025-03-10 DIAGNOSIS — G47.33 OBSTRUCTIVE SLEEP APNEA (ADULT) (PEDIATRIC): Primary | ICD-10-CM

## 2025-03-10 PROCEDURE — G2211 COMPLEX E/M VISIT ADD ON: HCPCS | Performed by: NURSE PRACTITIONER

## 2025-03-10 PROCEDURE — 99214 OFFICE O/P EST MOD 30 MIN: CPT | Performed by: NURSE PRACTITIONER

## 2025-03-10 ASSESSMENT — SLEEP AND FATIGUE QUESTIONNAIRES
HOW LIKELY ARE YOU TO NOD OFF OR FALL ASLEEP WHILE SITTING INACTIVE IN A PUBLIC PLACE: WOULD NEVER DOZE
HOW LIKELY ARE YOU TO NOD OFF OR FALL ASLEEP WHILE LYING DOWN TO REST IN THE AFTERNOON WHEN CIRCUMSTANCES PERMIT: SLIGHT CHANCE OF DOZING
HOW LIKELY ARE YOU TO NOD OFF OR FALL ASLEEP WHILE WATCHING TV: SLIGHT CHANCE OF DOZING
HOW LIKELY ARE YOU TO NOD OFF OR FALL ASLEEP WHEN YOU ARE A PASSENGER IN A CAR FOR AN HOUR WITHOUT A BREAK: WOULD NEVER DOZE
HOW LIKELY ARE YOU TO NOD OFF OR FALL ASLEEP WHILE LYING DOWN TO REST IN THE AFTERNOON WHEN CIRCUMSTANCES PERMIT: SLIGHT CHANCE OF DOZING
HOW LIKELY ARE YOU TO NOD OFF OR FALL ASLEEP WHILE SITTING AND TALKING TO SOMEONE: WOULD NEVER DOZE
HOW LIKELY ARE YOU TO NOD OFF OR FALL ASLEEP WHILE SITTING QUIETLY AFTER LUNCH WITHOUT ALCOHOL: SLIGHT CHANCE OF DOZING
HOW LIKELY ARE YOU TO NOD OFF OR FALL ASLEEP WHILE SITTING QUIETLY AFTER LUNCH WITHOUT ALCOHOL: SLIGHT CHANCE OF DOZING
HOW LIKELY ARE YOU TO NOD OFF OR FALL ASLEEP WHILE SITTING AND READING: SLIGHT CHANCE OF DOZING
HOW LIKELY ARE YOU TO NOD OFF OR FALL ASLEEP IN A CAR, WHILE STOPPED FOR A FEW MINUTES IN TRAFFIC: WOULD NEVER DOZE
HOW LIKELY ARE YOU TO NOD OFF OR FALL ASLEEP WHILE WATCHING TV: SLIGHT CHANCE OF DOZING
ESS TOTAL SCORE: 4
HOW LIKELY ARE YOU TO NOD OFF OR FALL ASLEEP WHEN YOU ARE A PASSENGER IN A CAR FOR AN HOUR WITHOUT A BREAK: WOULD NEVER DOZE
HOW LIKELY ARE YOU TO NOD OFF OR FALL ASLEEP WHILE SITTING AND READING: SLIGHT CHANCE OF DOZING
HOW LIKELY ARE YOU TO NOD OFF OR FALL ASLEEP WHILE SITTING AND TALKING TO SOMEONE: WOULD NEVER DOZE
HOW LIKELY ARE YOU TO NOD OFF OR FALL ASLEEP WHILE SITTING INACTIVE IN A PUBLIC PLACE: WOULD NEVER DOZE
HOW LIKELY ARE YOU TO NOD OFF OR FALL ASLEEP IN A CAR, WHILE STOPPED FOR A FEW MINUTES IN TRAFFIC: WOULD NEVER DOZE

## 2025-03-10 NOTE — PROGRESS NOTES
[facility]  [address]  [address]  [place] [address]  P- [phone]  F- [phone]   Video Visit- Follow up      Assessment/Plan:      1. Obstructive sleep apnea (adult) (pediatric)  Assessment & Plan:  Chronic-Stable: Reviewed and analyzed results of physiologic download from patient's machine and reviewed with patient.  Supplies and parts as needed for his machine.  These are medically necessary.  Limit caffeine use after 3pm. Based on the analyzed data will continue with current settings. Stable on his machine at current settings, getting benefit from the use, and having minimal side effects. Encouraged him to contact his DME if he would like to try a different style of mask. Will see him back in 1 year. Encouraged him to contact the office with any questions or concerns.    2. Class 3 severe obesity due to excess calories with serious comorbidity and body mass index (BMI) of 45.0 to 49.9 in adult  Assessment & Plan:  Chronic-not stable:  Discussed importance of treating obstructive sleep apnea and getting sufficient sleep to assist with weight control.  Discussed weight gain and/or weight loss may require adjustments to machine settings. Encouraged him to work on weight loss through diet and exercise.         Reviewed, analyzed, and documented physiologic data from patient's PAP machine.    This information was analyzed to assess complexity and medical decision making in regards to further testing and management.    The primary encounter diagnosis was Obstructive sleep apnea (adult) (pediatric). A diagnosis of Class 3 severe obesity due to excess calories with serious comorbidity and body mass index (BMI) of 45.0 to 49.9 in adult was also pertinent to this visit. The chronic medical conditions listed are directly related to the primary diagnosis listed above.  The management of the primary diagnosis affects the secondary diagnosis and vice versa.

## 2025-03-10 NOTE — PROGRESS NOTES
Diagnosis: [x] SUSANNE (G47.33) [] CSA (G47.31) [] Apnea (G47.30)   Length of Need: [x] 15 Months [] 99 Months [] Other:   Machine (MARCI!): [] Respironics Dream Station      Auto [] ResMed AirSense     Auto [] Other:     []  CPAP () [] Bilevel ()   Mode: [] Auto [] Spontaneous    Mode: [] Auto [] Spontaneous             Comfort Settings:      Humidifier: [] Heated ()        [x] Water chamber replacement ()/ 1 per 6 months        Mask:   [] Nasal () /1 per 3 months [x] Full Face () /1 per 3 months   [] Patient choice -Size and fit mask [x] Patient Choice - Size and fit mask   [] Dispense: [] Dispense:   [] Headgear () / 1 per 3 months [x] Headgear () / 1 per 3 months   [] Replacement Nasal Cushion ()/2 per month [x] Interface Replacement ()/1 per month   [] Replacement Nasal Pillows ()/2 per month         Tubing: [x] Heated ()/1 per 3 months    [] Standard ()/1 per 3 months [] Other:           Filters: [x] Non-disposable ()/1 per 6 months     [x] Ultra-Fine, Disposable ()/2 per month        Miscellaneous: [] Chin Strap ()/ 1 per 6 months [] O2 bleed-in:        LPM   [] Oxymetry on CPAP/Bilevel []  Other:         Start Order Date: 03/10/25    MEDICAL JUSTIFICATION:  I, the undersigned, certify that the above prescribed supplies are medically necessary for this patient’s wellbeing.  In my opinion, the supplies are both reasonable and necessary in reference to accepted standards of medicalpractice in treatment of this patient’s condition.    LADARIUS HICKEY NP    NPI: [id number]       Order Signed Date: 03/10/25  [facility]  Pulmonary, Sleep, and Critical Care    Pulmonary, Sleep, and Critical Care  [facility] [address]                          [address] [place] [address] [address]  [place] [address]                                    [place] [address]  Phone: [phone]    Fax:

## 2025-03-10 NOTE — ASSESSMENT & PLAN NOTE
Chronic-Stable: Reviewed and analyzed results of physiologic download from patient's machine and reviewed with patient.  Supplies and parts as needed for his machine.  These are medically necessary.  Limit caffeine use after 3pm. Based on the analyzed data will continue with current settings. Stable on his machine at current settings, getting benefit from the use, and having minimal side effects. Encouraged him to contact his DME if he would like to try a different style of mask. Will see him back in 1 year. Encouraged him to contact the office with any questions or concerns.

## 2025-03-12 ENCOUNTER — TELEPHONE (OUTPATIENT)
Dept: PRIMARY CARE CLINIC | Age: 30
End: 2025-03-12

## 2025-03-12 NOTE — TELEPHONE ENCOUNTER
Pt drop off physical forms to be signed by pcp, form placed om DB's desk. Can be sent back to pt via Messagemind or call him to pick it up.

## 2025-03-19 ENCOUNTER — TELEMEDICINE (OUTPATIENT)
Age: 30
End: 2025-03-19
Payer: COMMERCIAL

## 2025-03-19 DIAGNOSIS — F41.1 GENERALIZED ANXIETY DISORDER: Primary | ICD-10-CM

## 2025-03-19 PROCEDURE — 90832 PSYTX W PT 30 MINUTES: CPT | Performed by: PSYCHOLOGIST

## 2025-03-19 ASSESSMENT — PATIENT HEALTH QUESTIONNAIRE - PHQ9
4. FEELING TIRED OR HAVING LITTLE ENERGY: NOT AT ALL
SUM OF ALL RESPONSES TO PHQ QUESTIONS 1-9: 4
SUM OF ALL RESPONSES TO PHQ QUESTIONS 1-9: 4
7. TROUBLE CONCENTRATING ON THINGS, SUCH AS READING THE NEWSPAPER OR WATCHING TELEVISION: SEVERAL DAYS
1. LITTLE INTEREST OR PLEASURE IN DOING THINGS: NOT AT ALL
8. MOVING OR SPEAKING SO SLOWLY THAT OTHER PEOPLE COULD HAVE NOTICED. OR THE OPPOSITE, BEING SO FIGETY OR RESTLESS THAT YOU HAVE BEEN MOVING AROUND A LOT MORE THAN USUAL: NOT AT ALL
5. POOR APPETITE OR OVEREATING: SEVERAL DAYS
9. THOUGHTS THAT YOU WOULD BE BETTER OFF DEAD, OR OF HURTING YOURSELF: NOT AT ALL
6. FEELING BAD ABOUT YOURSELF - OR THAT YOU ARE A FAILURE OR HAVE LET YOURSELF OR YOUR FAMILY DOWN: NOT AT ALL
SUM OF ALL RESPONSES TO PHQ QUESTIONS 1-9: 4
10. IF YOU CHECKED OFF ANY PROBLEMS, HOW DIFFICULT HAVE THESE PROBLEMS MADE IT FOR YOU TO DO YOUR WORK, TAKE CARE OF THINGS AT HOME, OR GET ALONG WITH OTHER PEOPLE: SOMEWHAT DIFFICULT
2. FEELING DOWN, DEPRESSED OR HOPELESS: SEVERAL DAYS
SUM OF ALL RESPONSES TO PHQ QUESTIONS 1-9: 4

## 2025-03-19 NOTE — PROGRESS NOTES
Severe depression    Diagnosis:  1. Generalized anxiety disorder      Plan:  Interventions  Collaboratively discussed recent stressors, provided support and validation. Reinforced efforts toward going to grocery, meditating, and started walking for exercise. Describes benefit from setting goal related to progress but not specific quantities. Provided additional psychoed re: use of meditation, normalized drifting thoughts and encouraged non-judgmental redirection of his thoughts.     Behavioral Change Plan  See above. He wants to maintain current changes between visits.   Return in 2 weeks (on 4/2/2025).  Will fluidly adjust follow up interval in response to severity of symptoms, impact on functioning, and patient's goals.              Renny Meli, was evaluated through a synchronous (real-time) audio-video encounter. The patient (or guardian if applicable) is aware that this is a billable service, which includes applicable co-pays. This Virtual Visit was conducted with patient's (and/or legal guardian's) consent. Patient identification was verified, and a caregiver was present when appropriate.   The patient was located at Home: [address] [place], [place] [id number]  Provider was located at Other: [place], [place]  Confirm you are appropriately licensed, registered, or certified to deliver care in the state where the patient is located as indicated above. If you are not or unsure, please re-schedule the visit: Yes, I confirm.      Total time spent for this encounter:  30 minutes    --Lorena Johnson, PhD on 3/19/2025 at 11:23 AM    An electronic signature was used to authenticate this note.     Documentation was done using voice recognition dragon software.  Every effort was made to ensure accuracy; however, inadvertent, unintentional computerized transcription errors may be present.

## 2025-04-07 ENCOUNTER — TELEMEDICINE (OUTPATIENT)
Age: 30
End: 2025-04-07
Payer: COMMERCIAL

## 2025-04-07 DIAGNOSIS — F41.1 GENERALIZED ANXIETY DISORDER: Primary | ICD-10-CM

## 2025-04-07 PROCEDURE — 90832 PSYTX W PT 30 MINUTES: CPT | Performed by: PSYCHOLOGIST

## 2025-04-07 NOTE — PROGRESS NOTES
Behavioral Health Consultation  Lorena Johnson, Ph.D.  Clinical Psychologist  4/7/2025  9:00 AM  Name: Renny Garrison  YOB: 1995  PCP: Chris Carver, DO     TELEHEALTH VISIT -- Audio/Visual  This is his  15th  [facility] appointment..  Reason for Consult: Anxiety     S:  Renny is a 29 y.o. male seen for [facility] follow up visit addressing ANN. Describes increased stress related to current political climate, new tariffs - describes impact on costs and morale at work. He took some days off last week; has not applied to new jobs, worries about less stability if he makes a change. He is enjoying online dating, talking to some people has felt nice.      Since last visit, reports increase in food delivery which often impacts portions/quality he's eating. He did maintain progress toward walking for exercise (mostly treadmill d/t weather; reinforced by work walking challenge) and he continues to make grocery shopping a priority. Identifies need for change in organization/planning for meals with his groceries, finds he does not always eat the veg/salads he busy. Discussed triggers for food delivery (downloading xochilt, friend group order when together, convenience, craving meat&cheese). Explored whether current groceries are too restrictive for current stage of change, small ways to incorporate meat&cheese.     O:  MSE:  Appearance: good hygiene   Attitude: cooperative and friendly  Consciousness: alert  Orientation: oriented to person, place, time, general circumstance  Memory: recent and remote memory intact  Attention/Concentration: intact during session  Psychomotor Activity: normal  Eye Contact: normal  Speech: normal rate and volume, well-articulated  Mood: anxious  Affect: congruent   Perception: within normal limits  Thought Content: within normal limits  Thought Process: logical, coherent and goal-directed  Insight: good  Judgment: intact  Ability to understand instructions: Yes  Ability to

## 2025-04-08 ASSESSMENT — PATIENT HEALTH QUESTIONNAIRE - PHQ9
5. POOR APPETITE OR OVEREATING: SEVERAL DAYS
6. FEELING BAD ABOUT YOURSELF - OR THAT YOU ARE A FAILURE OR HAVE LET YOURSELF OR YOUR FAMILY DOWN: NOT AT ALL
8. MOVING OR SPEAKING SO SLOWLY THAT OTHER PEOPLE COULD HAVE NOTICED. OR THE OPPOSITE, BEING SO FIGETY OR RESTLESS THAT YOU HAVE BEEN MOVING AROUND A LOT MORE THAN USUAL: NOT AT ALL
7. TROUBLE CONCENTRATING ON THINGS, SUCH AS READING THE NEWSPAPER OR WATCHING TELEVISION: SEVERAL DAYS
4. FEELING TIRED OR HAVING LITTLE ENERGY: NOT AT ALL
SUM OF ALL RESPONSES TO PHQ QUESTIONS 1-9: 4
SUM OF ALL RESPONSES TO PHQ QUESTIONS 1-9: 4
2. FEELING DOWN, DEPRESSED OR HOPELESS: SEVERAL DAYS
3. TROUBLE FALLING OR STAYING ASLEEP: SEVERAL DAYS
1. LITTLE INTEREST OR PLEASURE IN DOING THINGS: NOT AT ALL
10. IF YOU CHECKED OFF ANY PROBLEMS, HOW DIFFICULT HAVE THESE PROBLEMS MADE IT FOR YOU TO DO YOUR WORK, TAKE CARE OF THINGS AT HOME, OR GET ALONG WITH OTHER PEOPLE: SOMEWHAT DIFFICULT
9. THOUGHTS THAT YOU WOULD BE BETTER OFF DEAD, OR OF HURTING YOURSELF: NOT AT ALL
SUM OF ALL RESPONSES TO PHQ QUESTIONS 1-9: 4
SUM OF ALL RESPONSES TO PHQ QUESTIONS 1-9: 4

## 2025-04-21 ENCOUNTER — TELEMEDICINE (OUTPATIENT)
Age: 30
End: 2025-04-21
Payer: COMMERCIAL

## 2025-04-21 DIAGNOSIS — F41.1 GENERALIZED ANXIETY DISORDER: Primary | ICD-10-CM

## 2025-04-21 PROCEDURE — 90832 PSYTX W PT 30 MINUTES: CPT | Performed by: PSYCHOLOGIST

## 2025-04-21 NOTE — PROGRESS NOTES
Behavioral Health Consultation  Lorena Johnson, Ph.D.  Clinical Psychologist  4/21/2025  9:00 AM  Name: Renny Garrison  YOB: 1995  PCP: Chris Carver, DO     TELEHEALTH VISIT -- Audio/Visual  This is his  16th  [facility] appointment..  Reason for Consult: Anxiety     S:  Renny is a 29 y.o. male seen for [facility] follow up visit addressing ANN.  Describes mildly anxious mood, denies notable change between visits. Discussed recent stress mgmt. Continues walking for exercise - treadmill or a park at lunch if at office. He is enjoying a new video game that came out a couple weeks ago, working on a project for his mom using 3D printer - designed himself, getting chores done at home. Saw family for Deborah. Processed impact of political changes on dad's job. He plans to stay at current job for now, worries about switching in current political climate.  Has a date planned in a couple wks, looking forward to it - reinforced challenging tendency to overthink \"what ifs.\"     Has been grocery shopping, using his groceries, \"I've got momentum\" with food preparation and better eating during the weekdays. Food delivery some but less than last visit. Currently goal is food delivery weekends only, with goal of reducing to 1 day per week.     O:  MSE:  Appearance: good hygiene   Attitude: cooperative and friendly  Consciousness: alert  Orientation: oriented to person, place, time, general circumstance  Memory: recent and remote memory intact  Attention/Concentration: intact during session  Psychomotor Activity: normal  Eye Contact: normal  Speech: normal rate and volume, well-articulated  Mood: anxious  Affect: congruent   Perception: within normal limits  Thought Content: within normal limits  Thought Process: logical, coherent and goal-directed  Insight: good  Judgment: intact  Ability to understand instructions: Yes  Ability to respond meaningfully: Yes  Morbid Ideation: no   Suicide Assessment: no suicidal

## 2025-05-06 DIAGNOSIS — E78.2 MIXED HYPERLIPIDEMIA: Chronic | ICD-10-CM

## 2025-05-06 DIAGNOSIS — F41.8 MIXED ANXIETY AND DEPRESSIVE DISORDER: ICD-10-CM

## 2025-05-06 DIAGNOSIS — E66.813 CLASS 3 SEVERE OBESITY DUE TO EXCESS CALORIES WITH SERIOUS COMORBIDITY AND BODY MASS INDEX (BMI) OF 45.0 TO 49.9 IN ADULT (HCC): ICD-10-CM

## 2025-05-06 RX ORDER — ATORVASTATIN CALCIUM 20 MG/1
20 TABLET, FILM COATED ORAL NIGHTLY
Qty: 90 TABLET | Refills: 3 | Status: SHIPPED | OUTPATIENT
Start: 2025-05-06

## 2025-05-06 RX ORDER — CITALOPRAM HYDROBROMIDE 20 MG/1
20 TABLET ORAL DAILY
Qty: 90 TABLET | Refills: 3 | Status: SHIPPED | OUTPATIENT
Start: 2025-05-06 | End: 2025-08-04

## 2025-05-15 DIAGNOSIS — F41.8 MIXED ANXIETY AND DEPRESSIVE DISORDER: ICD-10-CM

## 2025-05-15 RX ORDER — BUSPIRONE HYDROCHLORIDE 30 MG/1
30 TABLET ORAL DAILY
Qty: 90 TABLET | Refills: 1 | Status: SHIPPED | OUTPATIENT
Start: 2025-05-15

## 2025-05-15 NOTE — TELEPHONE ENCOUNTER
Medication:   Requested Prescriptions     Pending Prescriptions Disp Refills    busPIRone (BUSPAR) 30 MG tablet [Pharmacy Med Name: BUSPIRONE 30MG TABLETS] 90 tablet 1     Sig: TAKE 1 TABLET BY MOUTH DAILY     Last Filled:  2/6/25    Last appt: 11/18/2024   Next appt: Visit date not found

## 2025-05-19 ENCOUNTER — TELEPHONE (OUTPATIENT)
Dept: PHARMACY | Age: 30
End: 2025-05-19

## 2025-05-19 NOTE — TELEPHONE ENCOUNTER
Patient called to cancel appt on 6/16. Did not feel the need to r/s at this time. Will discharge patient from clinic at this time.     Ingrid Brothers, PharmD  [facility]  Rema: [phone]  Gabriella: [phone]  5/19/2025 4:26 PM    For Pharmacy Admin Tracking Only    Time Spent (min): 5

## 2025-05-30 ENCOUNTER — OFFICE VISIT (OUTPATIENT)
Dept: PRIMARY CARE CLINIC | Age: 30
End: 2025-05-30
Payer: COMMERCIAL

## 2025-05-30 VITALS
HEART RATE: 102 BPM | TEMPERATURE: 98.1 F | OXYGEN SATURATION: 98 % | WEIGHT: 315 LBS | BODY MASS INDEX: 50.46 KG/M2 | SYSTOLIC BLOOD PRESSURE: 115 MMHG | DIASTOLIC BLOOD PRESSURE: 69 MMHG

## 2025-05-30 DIAGNOSIS — S39.012A ACUTE MYOFASCIAL STRAIN OF LUMBAR REGION, INITIAL ENCOUNTER: Primary | ICD-10-CM

## 2025-05-30 PROCEDURE — 99213 OFFICE O/P EST LOW 20 MIN: CPT | Performed by: FAMILY MEDICINE

## 2025-05-30 SDOH — ECONOMIC STABILITY: FOOD INSECURITY: WITHIN THE PAST 12 MONTHS, YOU WORRIED THAT YOUR FOOD WOULD RUN OUT BEFORE YOU GOT MONEY TO BUY MORE.: NEVER TRUE

## 2025-05-30 SDOH — ECONOMIC STABILITY: FOOD INSECURITY: WITHIN THE PAST 12 MONTHS, THE FOOD YOU BOUGHT JUST DIDN'T LAST AND YOU DIDN'T HAVE MONEY TO GET MORE.: NEVER TRUE

## 2025-05-30 NOTE — PROGRESS NOTES
[facility]  [facility]  [address] [place] [id number]  Dept: [phone]  Dept Fax: [phone]     5/30/2025      Renny Meli   1995     Chief Complaint   Patient presents with    Abdominal Pain     Lower Right side abdomen/side/low back. Started one week ago.  Moving furniture        HPI  Pt comes in today for concerns of:    BACK/FLANK PAIN: New onset pain in the R lower abd/flank and around to the R lower back. No moment of injury, but was doing a lot of lifting and moving over the weekend. Dull pain consistently, but with certain movements of the trunk seems worse. Has been resting, but not any consistent treatment. Symptoms slightly improved. Prolonged rest seems to make it worse.     Wt Readings from Last 5 Encounters:   05/30/25 (!) 163.5 kg (360 lb 6.4 oz)   11/18/24 (!) 151.4 kg (333 lb 12.8 oz)   11/18/24 (!) 150.8 kg (332 lb 6.4 oz)   10/21/24 (!) 150.3 kg (331 lb 6.4 oz)   09/16/24 (!) 153.5 kg (338 lb 4.8 oz)         4/8/2025     9:51 AM 3/19/2025     9:17 AM 5/11/2024     7:02 AM 3/7/2023     8:10 AM   PHQ Scores   PHQ2 Score   1 6   PHQ9 Score   2 12       Information is confidential and restricted. Go to Review Flowsheets to unlock data.     Interpretation of Total Score Depression Severity: 1-4 = Minimal depression, 5-9 = Mild depression, 10-14 = Moderate depression, 15-19 = Moderately severe depression, 20-27 = Severe depression     Prior to Visit Medications    Medication Sig Taking? Authorizing Provider   busPIRone (BUSPAR) 30 MG tablet TAKE 1 TABLET BY MOUTH DAILY Yes Chris Carver DO   citalopram (CELEXA) 20 MG tablet TAKE 1 TABLET BY MOUTH DAILY Yes Chris Carver DO   atorvastatin (LIPITOR) 20 MG tablet TAKE 1 TABLET BY MOUTH EVERY NIGHT Yes Chris Carver DO       Past Medical History:   Diagnosis Date    History of panic attacks 03/07/2023    Mixed anxiety and depressive disorder 11/14/2019